# Patient Record
Sex: MALE | Race: WHITE | NOT HISPANIC OR LATINO | Employment: OTHER | URBAN - METROPOLITAN AREA
[De-identification: names, ages, dates, MRNs, and addresses within clinical notes are randomized per-mention and may not be internally consistent; named-entity substitution may affect disease eponyms.]

---

## 2019-03-19 ENCOUNTER — OFFICE VISIT (OUTPATIENT)
Dept: INTERNAL MEDICINE CLINIC | Facility: CLINIC | Age: 59
End: 2019-03-19
Payer: MEDICARE

## 2019-03-19 ENCOUNTER — APPOINTMENT (OUTPATIENT)
Dept: LAB | Facility: CLINIC | Age: 59
End: 2019-03-19
Payer: MEDICARE

## 2019-03-19 VITALS
BODY MASS INDEX: 26.54 KG/M2 | WEIGHT: 189.6 LBS | OXYGEN SATURATION: 97 % | SYSTOLIC BLOOD PRESSURE: 130 MMHG | HEART RATE: 87 BPM | HEIGHT: 71 IN | DIASTOLIC BLOOD PRESSURE: 90 MMHG

## 2019-03-19 DIAGNOSIS — Z12.5 PROSTATE CANCER SCREENING: ICD-10-CM

## 2019-03-19 DIAGNOSIS — R91.8 LUNG NODULES: ICD-10-CM

## 2019-03-19 DIAGNOSIS — F33.9 RECURRENT MAJOR DEPRESSIVE DISORDER, REMISSION STATUS UNSPECIFIED (HCC): ICD-10-CM

## 2019-03-19 DIAGNOSIS — R06.00 DYSPNEA AND RESPIRATORY ABNORMALITIES: ICD-10-CM

## 2019-03-19 DIAGNOSIS — J41.0 SIMPLE CHRONIC BRONCHITIS (HCC): ICD-10-CM

## 2019-03-19 DIAGNOSIS — R06.89 DYSPNEA AND RESPIRATORY ABNORMALITIES: ICD-10-CM

## 2019-03-19 DIAGNOSIS — F43.10 PTSD (POST-TRAUMATIC STRESS DISORDER): ICD-10-CM

## 2019-03-19 DIAGNOSIS — R06.89 DYSPNEA AND RESPIRATORY ABNORMALITIES: Primary | ICD-10-CM

## 2019-03-19 DIAGNOSIS — R11.0 NAUSEA: ICD-10-CM

## 2019-03-19 DIAGNOSIS — R06.00 DYSPNEA AND RESPIRATORY ABNORMALITIES: Primary | ICD-10-CM

## 2019-03-19 PROBLEM — F32.A DEPRESSION: Status: ACTIVE | Noted: 2019-03-19

## 2019-03-19 LAB
ALBUMIN SERPL BCP-MCNC: 4.7 G/DL (ref 3.5–5)
ALP SERPL-CCNC: 79 U/L (ref 46–116)
ALT SERPL W P-5'-P-CCNC: 22 U/L (ref 12–78)
ANION GAP SERPL CALCULATED.3IONS-SCNC: 6 MMOL/L (ref 4–13)
AST SERPL W P-5'-P-CCNC: 15 U/L (ref 5–45)
BASOPHILS # BLD AUTO: 0.04 THOUSANDS/ΜL (ref 0–0.1)
BASOPHILS NFR BLD AUTO: 1 % (ref 0–1)
BILIRUB SERPL-MCNC: 0.61 MG/DL (ref 0.2–1)
BILIRUB UR QL STRIP: NEGATIVE
BUN SERPL-MCNC: 10 MG/DL (ref 5–25)
CALCIUM SERPL-MCNC: 9.4 MG/DL (ref 8.3–10.1)
CHLORIDE SERPL-SCNC: 106 MMOL/L (ref 100–108)
CHOLEST SERPL-MCNC: 168 MG/DL (ref 50–200)
CLARITY UR: CLEAR
CO2 SERPL-SCNC: 27 MMOL/L (ref 21–32)
COLOR UR: YELLOW
CREAT SERPL-MCNC: 1.08 MG/DL (ref 0.6–1.3)
EOSINOPHIL # BLD AUTO: 0.41 THOUSAND/ΜL (ref 0–0.61)
EOSINOPHIL NFR BLD AUTO: 6 % (ref 0–6)
ERYTHROCYTE [DISTWIDTH] IN BLOOD BY AUTOMATED COUNT: 12.3 % (ref 11.6–15.1)
GFR SERPL CREATININE-BSD FRML MDRD: 75 ML/MIN/1.73SQ M
GLUCOSE P FAST SERPL-MCNC: 109 MG/DL (ref 65–99)
GLUCOSE UR STRIP-MCNC: NEGATIVE MG/DL
HCT VFR BLD AUTO: 49.2 % (ref 36.5–49.3)
HDLC SERPL-MCNC: 43 MG/DL (ref 40–60)
HGB BLD-MCNC: 16.2 G/DL (ref 12–17)
HGB UR QL STRIP.AUTO: NEGATIVE
IMM GRANULOCYTES # BLD AUTO: 0.02 THOUSAND/UL (ref 0–0.2)
IMM GRANULOCYTES NFR BLD AUTO: 0 % (ref 0–2)
KETONES UR STRIP-MCNC: ABNORMAL MG/DL
LDLC SERPL CALC-MCNC: 98 MG/DL (ref 0–100)
LEUKOCYTE ESTERASE UR QL STRIP: NEGATIVE
LYMPHOCYTES # BLD AUTO: 1.42 THOUSANDS/ΜL (ref 0.6–4.47)
LYMPHOCYTES NFR BLD AUTO: 19 % (ref 14–44)
MCH RBC QN AUTO: 31.5 PG (ref 26.8–34.3)
MCHC RBC AUTO-ENTMCNC: 32.9 G/DL (ref 31.4–37.4)
MCV RBC AUTO: 96 FL (ref 82–98)
MONOCYTES # BLD AUTO: 0.61 THOUSAND/ΜL (ref 0.17–1.22)
MONOCYTES NFR BLD AUTO: 8 % (ref 4–12)
NEUTROPHILS # BLD AUTO: 4.96 THOUSANDS/ΜL (ref 1.85–7.62)
NEUTS SEG NFR BLD AUTO: 66 % (ref 43–75)
NITRITE UR QL STRIP: NEGATIVE
NRBC BLD AUTO-RTO: 0 /100 WBCS
PH UR STRIP.AUTO: 5.5 [PH]
PLATELET # BLD AUTO: 254 THOUSANDS/UL (ref 149–390)
PMV BLD AUTO: 9.6 FL (ref 8.9–12.7)
POTASSIUM SERPL-SCNC: 4.5 MMOL/L (ref 3.5–5.3)
PROT SERPL-MCNC: 8.2 G/DL (ref 6.4–8.2)
PROT UR STRIP-MCNC: NEGATIVE MG/DL
PSA SERPL-MCNC: 0.5 NG/ML (ref 0–4)
RBC # BLD AUTO: 5.15 MILLION/UL (ref 3.88–5.62)
SODIUM SERPL-SCNC: 139 MMOL/L (ref 136–145)
SP GR UR STRIP.AUTO: >=1.03 (ref 1–1.03)
TRIGL SERPL-MCNC: 137 MG/DL
TSH SERPL DL<=0.05 MIU/L-ACNC: 2.74 UIU/ML (ref 0.36–3.74)
UROBILINOGEN UR QL STRIP.AUTO: 0.2 E.U./DL
WBC # BLD AUTO: 7.46 THOUSAND/UL (ref 4.31–10.16)

## 2019-03-19 PROCEDURE — 80053 COMPREHEN METABOLIC PANEL: CPT

## 2019-03-19 PROCEDURE — 85025 COMPLETE CBC W/AUTO DIFF WBC: CPT

## 2019-03-19 PROCEDURE — G0103 PSA SCREENING: HCPCS

## 2019-03-19 PROCEDURE — 80061 LIPID PANEL: CPT

## 2019-03-19 PROCEDURE — 81003 URINALYSIS AUTO W/O SCOPE: CPT | Performed by: INTERNAL MEDICINE

## 2019-03-19 PROCEDURE — 84443 ASSAY THYROID STIM HORMONE: CPT

## 2019-03-19 PROCEDURE — 93000 ELECTROCARDIOGRAM COMPLETE: CPT | Performed by: INTERNAL MEDICINE

## 2019-03-19 PROCEDURE — 99205 OFFICE O/P NEW HI 60 MIN: CPT | Performed by: INTERNAL MEDICINE

## 2019-03-19 PROCEDURE — 36415 COLL VENOUS BLD VENIPUNCTURE: CPT

## 2019-03-19 RX ORDER — BUPROPION HYDROCHLORIDE 150 MG/1
150 TABLET, EXTENDED RELEASE ORAL
COMMUNITY
Start: 2016-05-11 | End: 2019-04-11 | Stop reason: SDUPTHER

## 2019-03-19 NOTE — PATIENT INSTRUCTIONS
A patient with a known history of chronic lung disease with gradual increase in symptomatology  Noted to have lung nodules  Noted to have abnormal PFT    Rhonchi on examination  Electrocardiogram suggesting chronic lung disease with resultant cor pulmonale    Recommendations:  Diagnostic testing will include laboratory panels in addition to chest CT with contrast, echocardiogram, pulmonary function test  Therapeutic addition of long acting anti muscarinic Spiriva 1 capsule via inhaler daily      Revisit in 2-3 weeks
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
## 2019-03-28 ENCOUNTER — HOSPITAL ENCOUNTER (OUTPATIENT)
Dept: ULTRASOUND IMAGING | Facility: CLINIC | Age: 59
Discharge: HOME | End: 2019-03-28
Payer: MEDICARE

## 2019-03-28 ENCOUNTER — HOSPITAL ENCOUNTER (OUTPATIENT)
Dept: CT IMAGING | Facility: CLINIC | Age: 59
Discharge: HOME | End: 2019-03-28
Payer: MEDICARE

## 2019-03-28 DIAGNOSIS — R91.8 LUNG NODULES: ICD-10-CM

## 2019-03-28 DIAGNOSIS — R06.00 DYSPNEA AND RESPIRATORY ABNORMALITIES: ICD-10-CM

## 2019-03-28 DIAGNOSIS — J41.0 SIMPLE CHRONIC BRONCHITIS (HCC): ICD-10-CM

## 2019-03-28 DIAGNOSIS — R11.0 NAUSEA: ICD-10-CM

## 2019-03-28 DIAGNOSIS — R06.89 DYSPNEA AND RESPIRATORY ABNORMALITIES: ICD-10-CM

## 2019-03-28 PROCEDURE — 76700 US EXAM ABDOM COMPLETE: CPT

## 2019-03-28 PROCEDURE — 71260 CT THORAX DX C+: CPT

## 2019-03-28 RX ADMIN — IOHEXOL 85 ML: 350 INJECTION, SOLUTION INTRAVENOUS at 09:25

## 2019-04-02 ENCOUNTER — TELEPHONE (OUTPATIENT)
Dept: INTERNAL MEDICINE CLINIC | Facility: CLINIC | Age: 59
End: 2019-04-02

## 2019-04-03 ENCOUNTER — TELEPHONE (OUTPATIENT)
Dept: INTERNAL MEDICINE CLINIC | Facility: CLINIC | Age: 59
End: 2019-04-03

## 2019-04-09 ENCOUNTER — HOSPITAL ENCOUNTER (OUTPATIENT)
Dept: NON INVASIVE DIAGNOSTICS | Facility: CLINIC | Age: 59
Discharge: HOME | End: 2019-04-09
Payer: MEDICARE

## 2019-04-09 DIAGNOSIS — R06.00 DYSPNEA AND RESPIRATORY ABNORMALITIES: ICD-10-CM

## 2019-04-09 DIAGNOSIS — J41.0 SIMPLE CHRONIC BRONCHITIS (HCC): ICD-10-CM

## 2019-04-09 DIAGNOSIS — R91.8 LUNG NODULES: ICD-10-CM

## 2019-04-09 DIAGNOSIS — R06.89 DYSPNEA AND RESPIRATORY ABNORMALITIES: ICD-10-CM

## 2019-04-09 PROCEDURE — 93306 TTE W/DOPPLER COMPLETE: CPT

## 2019-04-10 PROCEDURE — 93306 TTE W/DOPPLER COMPLETE: CPT | Performed by: INTERNAL MEDICINE

## 2019-04-11 ENCOUNTER — APPOINTMENT (OUTPATIENT)
Dept: LAB | Facility: CLINIC | Age: 59
End: 2019-04-11
Payer: MEDICARE

## 2019-04-11 ENCOUNTER — OFFICE VISIT (OUTPATIENT)
Dept: INTERNAL MEDICINE CLINIC | Facility: CLINIC | Age: 59
End: 2019-04-11
Payer: MEDICARE

## 2019-04-11 VITALS
HEART RATE: 88 BPM | HEIGHT: 70 IN | BODY MASS INDEX: 27.2 KG/M2 | SYSTOLIC BLOOD PRESSURE: 132 MMHG | DIASTOLIC BLOOD PRESSURE: 94 MMHG | WEIGHT: 190 LBS | OXYGEN SATURATION: 94 %

## 2019-04-11 DIAGNOSIS — J41.0 SIMPLE CHRONIC BRONCHITIS (HCC): Primary | ICD-10-CM

## 2019-04-11 DIAGNOSIS — F33.9 RECURRENT MAJOR DEPRESSIVE DISORDER, REMISSION STATUS UNSPECIFIED (HCC): ICD-10-CM

## 2019-04-11 DIAGNOSIS — R11.0 NAUSEA: ICD-10-CM

## 2019-04-11 DIAGNOSIS — R73.9 HYPERGLYCEMIA: ICD-10-CM

## 2019-04-11 DIAGNOSIS — Z12.11 COLON CANCER SCREENING: ICD-10-CM

## 2019-04-11 LAB
EST. AVERAGE GLUCOSE BLD GHB EST-MCNC: 94 MG/DL
HBA1C MFR BLD: 4.9 % (ref 4.2–6.3)

## 2019-04-11 PROCEDURE — 83036 HEMOGLOBIN GLYCOSYLATED A1C: CPT

## 2019-04-11 PROCEDURE — 99213 OFFICE O/P EST LOW 20 MIN: CPT | Performed by: INTERNAL MEDICINE

## 2019-04-11 PROCEDURE — 36415 COLL VENOUS BLD VENIPUNCTURE: CPT

## 2019-04-11 RX ORDER — BUPROPION HYDROCHLORIDE 150 MG/1
150 TABLET, EXTENDED RELEASE ORAL DAILY
Qty: 90 TABLET | Refills: 1 | Status: SHIPPED | OUTPATIENT
Start: 2019-04-11 | End: 2019-06-13 | Stop reason: CLARIF

## 2019-05-24 ENCOUNTER — OFFICE VISIT (OUTPATIENT)
Dept: INTERNAL MEDICINE CLINIC | Facility: CLINIC | Age: 59
End: 2019-05-24
Payer: MEDICARE

## 2019-05-24 VITALS
BODY MASS INDEX: 27.17 KG/M2 | HEIGHT: 70 IN | DIASTOLIC BLOOD PRESSURE: 88 MMHG | SYSTOLIC BLOOD PRESSURE: 152 MMHG | HEART RATE: 120 BPM | TEMPERATURE: 98.7 F | WEIGHT: 189.8 LBS | OXYGEN SATURATION: 96 %

## 2019-05-24 DIAGNOSIS — F33.9 RECURRENT MAJOR DEPRESSIVE DISORDER, REMISSION STATUS UNSPECIFIED (HCC): ICD-10-CM

## 2019-05-24 DIAGNOSIS — R00.0 TACHYCARDIA: ICD-10-CM

## 2019-05-24 DIAGNOSIS — F43.10 PTSD (POST-TRAUMATIC STRESS DISORDER): ICD-10-CM

## 2019-05-24 DIAGNOSIS — J41.0 SIMPLE CHRONIC BRONCHITIS (HCC): Primary | ICD-10-CM

## 2019-05-24 DIAGNOSIS — R06.02 SOB (SHORTNESS OF BREATH): ICD-10-CM

## 2019-05-24 PROCEDURE — 99214 OFFICE O/P EST MOD 30 MIN: CPT | Performed by: PHYSICIAN ASSISTANT

## 2019-05-24 PROCEDURE — 93000 ELECTROCARDIOGRAM COMPLETE: CPT | Performed by: PHYSICIAN ASSISTANT

## 2019-05-24 RX ORDER — PREDNISONE 20 MG/1
TABLET ORAL DAILY
COMMUNITY
End: 2019-06-13 | Stop reason: CLARIF

## 2019-06-07 ENCOUNTER — TELEPHONE (OUTPATIENT)
Dept: INTERNAL MEDICINE CLINIC | Facility: CLINIC | Age: 59
End: 2019-06-07

## 2019-06-07 DIAGNOSIS — J41.0 SIMPLE CHRONIC BRONCHITIS (HCC): Primary | ICD-10-CM

## 2019-06-07 RX ORDER — FLUTICASONE FUROATE AND VILANTEROL 100; 25 UG/1; UG/1
1 POWDER RESPIRATORY (INHALATION) DAILY
Qty: 1 EACH | Refills: 3 | Status: SHIPPED | OUTPATIENT
Start: 2019-06-07 | End: 2019-10-02 | Stop reason: SDUPTHER

## 2019-06-13 ENCOUNTER — OFFICE VISIT (OUTPATIENT)
Dept: INTERNAL MEDICINE CLINIC | Facility: CLINIC | Age: 59
End: 2019-06-13
Payer: MEDICARE

## 2019-06-13 VITALS
TEMPERATURE: 98 F | HEIGHT: 70 IN | WEIGHT: 195 LBS | SYSTOLIC BLOOD PRESSURE: 108 MMHG | DIASTOLIC BLOOD PRESSURE: 80 MMHG | BODY MASS INDEX: 27.92 KG/M2 | OXYGEN SATURATION: 94 % | HEART RATE: 90 BPM

## 2019-06-13 DIAGNOSIS — J41.0 SIMPLE CHRONIC BRONCHITIS (HCC): Primary | ICD-10-CM

## 2019-06-13 DIAGNOSIS — F33.9 RECURRENT MAJOR DEPRESSIVE DISORDER, REMISSION STATUS UNSPECIFIED (HCC): ICD-10-CM

## 2019-06-13 DIAGNOSIS — F43.10 PTSD (POST-TRAUMATIC STRESS DISORDER): ICD-10-CM

## 2019-06-13 PROCEDURE — 99214 OFFICE O/P EST MOD 30 MIN: CPT | Performed by: PHYSICIAN ASSISTANT

## 2019-06-13 RX ORDER — CLONAZEPAM 0.5 MG/1
0.5 TABLET ORAL 2 TIMES DAILY
COMMUNITY
End: 2021-06-20

## 2019-06-13 RX ORDER — PANTOPRAZOLE SODIUM 40 MG/1
40 TABLET, DELAYED RELEASE ORAL DAILY
COMMUNITY
End: 2021-06-20

## 2019-06-13 RX ORDER — CITALOPRAM 20 MG/1
20 TABLET ORAL DAILY
COMMUNITY
End: 2021-06-20

## 2019-06-13 RX ORDER — LANOLIN ALCOHOL/MO/W.PET/CERES
3 CREAM (GRAM) TOPICAL
COMMUNITY
End: 2021-06-20

## 2019-06-13 RX ORDER — LISINOPRIL 5 MG/1
5 TABLET ORAL DAILY
COMMUNITY
End: 2020-04-28 | Stop reason: SDUPTHER

## 2019-06-18 ENCOUNTER — CONSULT (OUTPATIENT)
Dept: PULMONOLOGY | Facility: CLINIC | Age: 59
End: 2019-06-18
Payer: MEDICARE

## 2019-06-18 VITALS
DIASTOLIC BLOOD PRESSURE: 80 MMHG | BODY MASS INDEX: 27.92 KG/M2 | OXYGEN SATURATION: 93 % | WEIGHT: 195 LBS | HEART RATE: 52 BPM | HEIGHT: 70 IN | SYSTOLIC BLOOD PRESSURE: 120 MMHG

## 2019-06-18 DIAGNOSIS — Z72.0 TOBACCO USE: ICD-10-CM

## 2019-06-18 DIAGNOSIS — R91.8 LUNG NODULES: ICD-10-CM

## 2019-06-18 DIAGNOSIS — J41.0 SIMPLE CHRONIC BRONCHITIS (HCC): Primary | ICD-10-CM

## 2019-06-18 PROCEDURE — 99204 OFFICE O/P NEW MOD 45 MIN: CPT | Performed by: PHYSICIAN ASSISTANT

## 2019-06-18 PROCEDURE — 94010 BREATHING CAPACITY TEST: CPT | Performed by: PHYSICIAN ASSISTANT

## 2019-10-02 DIAGNOSIS — J41.0 SIMPLE CHRONIC BRONCHITIS (HCC): ICD-10-CM

## 2019-10-02 RX ORDER — FLUTICASONE FUROATE AND VILANTEROL TRIFENATATE 100; 25 UG/1; UG/1
POWDER RESPIRATORY (INHALATION)
Qty: 1 INHALER | Refills: 3 | Status: SHIPPED | OUTPATIENT
Start: 2019-10-02 | End: 2019-12-12 | Stop reason: SDUPTHER

## 2019-10-17 DIAGNOSIS — J41.0 SIMPLE CHRONIC BRONCHITIS (HCC): ICD-10-CM

## 2019-10-17 RX ORDER — FLUTICASONE FUROATE AND VILANTEROL 100; 25 UG/1; UG/1
POWDER RESPIRATORY (INHALATION)
Qty: 1 INHALER | Refills: 3 | Status: CANCELLED | OUTPATIENT
Start: 2019-10-17

## 2019-10-17 NOTE — TELEPHONE ENCOUNTER
Dang Diaz was just sent in not that long 10/2/19 with refills    Pharmacy believe's he needs proair, pharmacy has one on file if he needs refill

## 2019-10-17 NOTE — TELEPHONE ENCOUNTER
Disregard previous message  Medication was sent to the pharmacy on October 2nd with 3 refills  Please make sure that the patient still has refills left on this prescription  I will hold off on reordering a new medication refill to use because patient

## 2019-10-22 ENCOUNTER — OFFICE VISIT (OUTPATIENT)
Dept: PULMONOLOGY | Facility: CLINIC | Age: 59
End: 2019-10-22
Payer: MEDICARE

## 2019-10-22 ENCOUNTER — TELEPHONE (OUTPATIENT)
Dept: PULMONOLOGY | Facility: CLINIC | Age: 59
End: 2019-10-22

## 2019-10-22 VITALS
SYSTOLIC BLOOD PRESSURE: 128 MMHG | HEIGHT: 70 IN | WEIGHT: 194 LBS | BODY MASS INDEX: 27.77 KG/M2 | DIASTOLIC BLOOD PRESSURE: 68 MMHG | HEART RATE: 76 BPM | OXYGEN SATURATION: 93 %

## 2019-10-22 DIAGNOSIS — J41.0 SIMPLE CHRONIC BRONCHITIS (HCC): Primary | ICD-10-CM

## 2019-10-22 DIAGNOSIS — Z72.0 TOBACCO USE: ICD-10-CM

## 2019-10-22 DIAGNOSIS — R06.89 DYSPNEA AND RESPIRATORY ABNORMALITIES: ICD-10-CM

## 2019-10-22 DIAGNOSIS — R91.8 LUNG NODULES: ICD-10-CM

## 2019-10-22 DIAGNOSIS — R06.00 DYSPNEA AND RESPIRATORY ABNORMALITIES: ICD-10-CM

## 2019-10-22 PROCEDURE — 99214 OFFICE O/P EST MOD 30 MIN: CPT | Performed by: PHYSICIAN ASSISTANT

## 2019-10-22 NOTE — PROGRESS NOTES
Assessment/Plan:   Diagnoses and all orders for this visit:    Simple chronic bronchitis (HCC)    Tobacco use  -     nicotine (NICOTROL) 10 MG inhaler; Use one cartridge for 20 min at a time as needed    Dyspnea and respiratory abnormalities  -     Pulse oximetry overnight    Lung nodules    Patient here today for follow up  He is overall stable with his breathing, [place] to [place] [facility]  He does use his rescue inhaler prior to any strenuous activity  Spirometry done on last visit showed severe obstruction with an FEV1 of 49%, similar to his prior PFT in 2017  He will continue with the Breo daily, albuterol 4 times per day as needed  He is complaining of shortness of Breath when he wakes in the morning, does have some nighttime awakenings due to nightmares but generally feels well rested when he wakes in the morning, never been told that he snores  Will start with an overnight pulse ox to check his O2 sats over night  He will be due for CT scan in March 2020 to follow lung nodules  He continues to smoke a few cigarettes per month when he feels stressed  Will give him a Nicotrol inhaler to use during those times  He has tried gum and lozenges which did not help  He will follow up with us in 6 months or sooner if necessary  Return in about 6 months (around 4/22/2020)  All questions are answered to the patient's satisfaction and understanding  He verbalizes understanding  He is encouraged to call with any further questions or concerns  Portions of the record may have been created with voice recognition software  Occasional wrong word or "sound a like" substitutions may have occurred due to the inherent limitations of voice recognition software  Read the chart carefully and recognize, using context, where substitutions have occurred      Electronically Signed by Preston Da Silva ELMER    ______________________________________________________________________    Chief Complaint:   Chief Complaint   Patient presents with    Follow-up       Patient ID: Nickie Alexander is a 63 y o  y o  male has a past medical history of Depression, Lung nodules, PTSD (post-traumatic stress disorder), and Tobacco abuse  10/22/2019  Patient presents today for follow-up visit  Patient is a 52-year-old male with greater than 30 pack year smoking history still smoking occasionally with past medical history of chronic bronchitis, depression, PTSD  He is here today for follow up  He continues to do well overall with his breathing, continues on Breo  He does use his rescue inhaler prior to exercise which does help but he still does have some difficulty with big hills  He also noticed some shortness of breath when he wakes up in the morning, not associated with coughing  He generally wakes up feeling well rested, does wake up during the night sometimes due to nightmares, he does not think he snores at night  Review of Systems   Constitutional: Negative  HENT: Negative  Respiratory: Positive for shortness of breath (SCHAEFER)  Cardiovascular: Negative  Gastrointestinal: Negative  Genitourinary: Negative  Musculoskeletal: Negative  Skin: Negative  Allergic/Immunologic: Positive for environmental allergies  Neurological: Negative  Psychiatric/Behavioral: Negative  Smoking history: He reports that he has been smoking cigarettes   He uses smokeless tobacco     The following portions of the patient's history were reviewed and updated as appropriate: allergies, current medications, past family history, past medical history, past social history, past surgical history and problem list     Immunization History   Administered Date(s) Administered     Influenza (IM) Preservative Free 11/01/2011, 11/05/2012, 09/30/2013    Hep B, adult 01/16/2017, 10/12/2017    Pneumococcal Polysaccharide PPV23 12/03/2010    Tdap 02/29/2016     Current Outpatient Medications   Medication Sig Dispense Refill    Albuterol Sulfate (PROAIR RESPICLICK) 946 (90 Base) MCG/ACT AEPB Inhale 90 mcg every 4 (four) hours as needed      BREO ELLIPTA 100-25 MCG/INH inhaler take 1 inhalation by mouth once daily Rinse mouth after use 1 Inhaler 3    citalopram (CeleXA) 20 mg tablet Take 20 mg by mouth daily      clonazePAM (KlonoPIN) 0 5 mg tablet Take 0 5 mg by mouth 2 (two) times a day      FLUTICASONE PROPIONATE, NASAL, NA 50 mcg into each nostril daily      lisinopril (ZESTRIL) 5 mg tablet Take 5 mg by mouth daily      melatonin 3 mg Take 3 mg by mouth daily at bedtime      pantoprazole (PROTONIX) 40 mg tablet Take 40 mg by mouth daily      nicotine (NICOTROL) 10 MG inhaler Use one cartridge for 20 min at a time as needed 42 each 1     No current facility-administered medications for this visit  Allergies: Chocolate and Tomato    Objective:  Vitals:    10/22/19 1257   BP: 128/68   Pulse: 76   SpO2: 93%   Weight: 88 kg (194 lb)   Height: 5' 10" (1 778 m)   Oxygen Therapy  SpO2: 93 %    Wt Readings from Last 3 Encounters:   10/22/19 88 kg (194 lb)   06/18/19 88 5 kg (195 lb)   06/13/19 88 5 kg (195 lb)     Body mass index is 27 84 kg/m²  Physical Exam   Constitutional: He is oriented to person, place, and time  He appears well-developed and well-nourished  No distress  HENT:   Mouth/Throat: Oropharynx is clear and moist    Eyes: Pupils are equal, round, and reactive to light  Cardiovascular: Normal rate, regular rhythm and normal heart sounds  No murmur heard  Pulmonary/Chest: Effort normal and breath sounds normal  No accessory muscle usage  No respiratory distress  He has no decreased breath sounds  He has no wheezes  He has no rhonchi  He has no rales  Abdominal: Soft  There is no tenderness  Musculoskeletal: Normal range of motion  Neurological: He is alert and oriented to person, place, and time  Skin: Skin is warm and dry  No rash noted  Psychiatric: He has a normal mood and affect  Lab Review:   Lab Results   Component Value Date    K 4 5 03/19/2019     03/19/2019    CO2 27 03/19/2019    BUN 10 03/19/2019    CREATININE 1 08 03/19/2019    CALCIUM 9 4 03/19/2019     Lab Results   Component Value Date    WBC 7 46 03/19/2019    HGB 16 2 03/19/2019    HCT 49 2 03/19/2019    MCV 96 03/19/2019     03/19/2019       Diagnostics:  I have personally reviewed pertinent reports  Reviewed prior spirometry and CT scan  Office Spirometry Results:     ESS:    No results found

## 2019-10-24 ENCOUNTER — CLINICAL SUPPORT (OUTPATIENT)
Dept: INTERNAL MEDICINE CLINIC | Facility: CLINIC | Age: 59
End: 2019-10-24
Payer: MEDICARE

## 2019-10-24 DIAGNOSIS — Z23 ENCOUNTER FOR IMMUNIZATION: Primary | ICD-10-CM

## 2019-10-24 PROCEDURE — G0008 ADMIN INFLUENZA VIRUS VAC: HCPCS

## 2019-10-24 PROCEDURE — 90682 RIV4 VACC RECOMBINANT DNA IM: CPT

## 2019-11-07 ENCOUNTER — TELEPHONE (OUTPATIENT)
Dept: PULMONOLOGY | Facility: CLINIC | Age: 59
End: 2019-11-07

## 2019-12-12 ENCOUNTER — TELEPHONE (OUTPATIENT)
Dept: INTERNAL MEDICINE CLINIC | Facility: CLINIC | Age: 59
End: 2019-12-12

## 2019-12-12 DIAGNOSIS — J41.0 SIMPLE CHRONIC BRONCHITIS (HCC): Primary | ICD-10-CM

## 2019-12-12 DIAGNOSIS — J41.0 SIMPLE CHRONIC BRONCHITIS (HCC): ICD-10-CM

## 2019-12-12 RX ORDER — FLUTICASONE FUROATE AND VILANTEROL 100; 25 UG/1; UG/1
1 POWDER RESPIRATORY (INHALATION) DAILY
Qty: 1 INHALER | Refills: 3 | Status: SHIPPED | OUTPATIENT
Start: 2019-12-12 | End: 2020-02-04 | Stop reason: SDUPTHER

## 2019-12-12 NOTE — TELEPHONE ENCOUNTER
Patient needs a refill on the BREO ELLIPTA 100-25 MCG/INH inhaler     Send script to [email or web address] on [address]

## 2020-02-04 DIAGNOSIS — J41.0 SIMPLE CHRONIC BRONCHITIS (HCC): ICD-10-CM

## 2020-02-04 RX ORDER — ALBUTEROL SULFATE 90 UG/1
1 AEROSOL, METERED RESPIRATORY (INHALATION) 4 TIMES DAILY
COMMUNITY
End: 2020-03-12

## 2020-02-04 RX ORDER — FLUTICASONE FUROATE AND VILANTEROL 100; 25 UG/1; UG/1
1 POWDER RESPIRATORY (INHALATION) DAILY
Qty: 1 INHALER | Refills: 3 | Status: SHIPPED | OUTPATIENT
Start: 2020-02-04 | End: 2020-04-23 | Stop reason: SDUPTHER

## 2020-02-04 NOTE — TELEPHONE ENCOUNTER
Requesting refill : Albuterol Sulfate (PROAIR RESPICLICK) 004 (90 Base) MCG/ACT AEPB    [facility] # [phone]        Please remove [facility]

## 2020-02-04 NOTE — TELEPHONE ENCOUNTER
Patient is at the pharmacy NOW waiting  for medication      [facility]  claims he normally gets  fluticasone-vilanterol (BREO ELLIPTA) 100-25 mcg/inh inhaler     Not Albuterol Sulfate (PROAIR RESPICLICK)    Needs a new RX sent over or verification

## 2020-03-12 DIAGNOSIS — J41.0 SIMPLE CHRONIC BRONCHITIS (HCC): Primary | ICD-10-CM

## 2020-04-06 PROBLEM — F33.9 RECURRENT MAJOR DEPRESSIVE DISORDER (HCC): Status: ACTIVE | Noted: 2019-03-19

## 2020-04-10 ENCOUNTER — TELEPHONE (OUTPATIENT)
Dept: INTERNAL MEDICINE CLINIC | Facility: CLINIC | Age: 60
End: 2020-04-10

## 2020-04-23 ENCOUNTER — TELEMEDICINE (OUTPATIENT)
Dept: PULMONOLOGY | Facility: CLINIC | Age: 60
End: 2020-04-23
Payer: MEDICARE

## 2020-04-23 VITALS — HEIGHT: 70 IN | WEIGHT: 195 LBS | BODY MASS INDEX: 27.92 KG/M2

## 2020-04-23 DIAGNOSIS — J41.0 SIMPLE CHRONIC BRONCHITIS (HCC): ICD-10-CM

## 2020-04-23 PROCEDURE — 99213 OFFICE O/P EST LOW 20 MIN: CPT | Performed by: PHYSICIAN ASSISTANT

## 2020-04-23 RX ORDER — ALBUTEROL SULFATE 90 UG/1
2 AEROSOL, METERED RESPIRATORY (INHALATION) EVERY 6 HOURS PRN
Qty: 18 G | Refills: 1 | Status: SHIPPED | OUTPATIENT
Start: 2020-04-23 | End: 2020-08-15 | Stop reason: SDUPTHER

## 2020-04-23 RX ORDER — FLUTICASONE FUROATE AND VILANTEROL 100; 25 UG/1; UG/1
1 POWDER RESPIRATORY (INHALATION) DAILY
Qty: 1 INHALER | Refills: 3 | Status: SHIPPED | OUTPATIENT
Start: 2020-04-23 | End: 2020-09-08 | Stop reason: SDUPTHER

## 2020-04-28 ENCOUNTER — TELEMEDICINE (OUTPATIENT)
Dept: INTERNAL MEDICINE CLINIC | Facility: CLINIC | Age: 60
End: 2020-04-28
Payer: MEDICARE

## 2020-04-28 VITALS
SYSTOLIC BLOOD PRESSURE: 122 MMHG | BODY MASS INDEX: 27.2 KG/M2 | DIASTOLIC BLOOD PRESSURE: 67 MMHG | TEMPERATURE: 98.6 F | WEIGHT: 190 LBS | HEIGHT: 70 IN

## 2020-04-28 DIAGNOSIS — F33.42 RECURRENT MAJOR DEPRESSIVE DISORDER, IN FULL REMISSION (HCC): ICD-10-CM

## 2020-04-28 DIAGNOSIS — R73.9 HYPERGLYCEMIA: ICD-10-CM

## 2020-04-28 DIAGNOSIS — I10 ESSENTIAL HYPERTENSION: ICD-10-CM

## 2020-04-28 DIAGNOSIS — Z11.59 ENCOUNTER FOR HEPATITIS C SCREENING TEST FOR LOW RISK PATIENT: Primary | ICD-10-CM

## 2020-04-28 DIAGNOSIS — J41.0 SIMPLE CHRONIC BRONCHITIS (HCC): ICD-10-CM

## 2020-04-28 DIAGNOSIS — Z12.11 COLON CANCER SCREENING: ICD-10-CM

## 2020-04-28 DIAGNOSIS — Z11.4 ENCOUNTER FOR SCREENING FOR HIV: ICD-10-CM

## 2020-04-28 DIAGNOSIS — Z12.5 PROSTATE CANCER SCREENING: ICD-10-CM

## 2020-04-28 PROBLEM — Z72.0 TOBACCO ABUSE: Status: ACTIVE | Noted: 2020-04-28

## 2020-04-28 PROCEDURE — 99214 OFFICE O/P EST MOD 30 MIN: CPT | Performed by: INTERNAL MEDICINE

## 2020-04-28 RX ORDER — LISINOPRIL 5 MG/1
5 TABLET ORAL DAILY
Qty: 90 TABLET | Refills: 3 | Status: SHIPPED | OUTPATIENT
Start: 2020-04-28 | End: 2021-06-20

## 2020-04-29 PROBLEM — F33.42 MAJOR DEPRESSIVE DISORDER, RECURRENT, IN FULL REMISSION (HCC): Status: ACTIVE | Noted: 2019-03-19

## 2020-08-15 ENCOUNTER — TELEPHONE (OUTPATIENT)
Dept: INTERNAL MEDICINE CLINIC | Facility: CLINIC | Age: 60
End: 2020-08-15

## 2020-08-15 DIAGNOSIS — J41.0 SIMPLE CHRONIC BRONCHITIS (HCC): ICD-10-CM

## 2020-08-17 RX ORDER — ALBUTEROL SULFATE 90 UG/1
2 AEROSOL, METERED RESPIRATORY (INHALATION) EVERY 6 HOURS PRN
Qty: 18 G | Refills: 1 | Status: SHIPPED | OUTPATIENT
Start: 2020-08-17 | End: 2021-10-26 | Stop reason: SDUPTHER

## 2020-08-17 RX ORDER — ALBUTEROL SULFATE 90 UG/1
AEROSOL, METERED RESPIRATORY (INHALATION)
Qty: 8.5 G | Refills: 3 | Status: SHIPPED | OUTPATIENT
Start: 2020-08-17 | End: 2021-02-03 | Stop reason: SDUPTHER

## 2020-09-08 ENCOUNTER — TELEPHONE (OUTPATIENT)
Dept: PULMONOLOGY | Facility: CLINIC | Age: 60
End: 2020-09-08

## 2020-09-08 DIAGNOSIS — J41.0 SIMPLE CHRONIC BRONCHITIS (HCC): ICD-10-CM

## 2020-09-08 RX ORDER — FLUTICASONE FUROATE AND VILANTEROL TRIFENATATE 100; 25 UG/1; UG/1
1 POWDER RESPIRATORY (INHALATION) DAILY
Qty: 3 INHALER | Refills: 1 | Status: SHIPPED | OUTPATIENT
Start: 2020-09-08 | End: 2020-10-16

## 2020-10-16 ENCOUNTER — TELEMEDICINE (OUTPATIENT)
Dept: PULMONOLOGY | Facility: CLINIC | Age: 60
End: 2020-10-16
Payer: MEDICARE

## 2020-10-16 DIAGNOSIS — R91.8 LUNG NODULES: ICD-10-CM

## 2020-10-16 DIAGNOSIS — J41.0 SIMPLE CHRONIC BRONCHITIS (HCC): Primary | ICD-10-CM

## 2020-10-16 DIAGNOSIS — Z72.0 TOBACCO ABUSE: ICD-10-CM

## 2020-10-16 PROCEDURE — 99213 OFFICE O/P EST LOW 20 MIN: CPT | Performed by: PHYSICIAN ASSISTANT

## 2020-11-13 ENCOUNTER — PREP FOR PROCEDURE (OUTPATIENT)
Dept: SURGERY | Facility: CLINIC | Age: 60
End: 2020-11-13

## 2020-11-13 DIAGNOSIS — Z12.11 SCREENING FOR COLON CANCER: Primary | ICD-10-CM

## 2021-01-27 DIAGNOSIS — J41.0 SIMPLE CHRONIC BRONCHITIS (HCC): ICD-10-CM

## 2021-01-27 RX ORDER — FLUTICASONE FUROATE, UMECLIDINIUM BROMIDE AND VILANTEROL TRIFENATATE 100; 62.5; 25 UG/1; UG/1; UG/1
POWDER RESPIRATORY (INHALATION)
Qty: 1 INHALER | Refills: 5 | Status: SHIPPED | OUTPATIENT
Start: 2021-01-27 | End: 2021-02-03 | Stop reason: SDUPTHER

## 2021-02-03 ENCOUNTER — TELEPHONE (OUTPATIENT)
Dept: INTERNAL MEDICINE CLINIC | Facility: CLINIC | Age: 61
End: 2021-02-03

## 2021-02-03 DIAGNOSIS — J41.0 SIMPLE CHRONIC BRONCHITIS (HCC): ICD-10-CM

## 2021-02-03 RX ORDER — FLUTICASONE FUROATE, UMECLIDINIUM BROMIDE AND VILANTEROL TRIFENATATE 100; 62.5; 25 UG/1; UG/1; UG/1
1 POWDER RESPIRATORY (INHALATION) DAILY
Qty: 1 INHALER | Refills: 1 | Status: SHIPPED | OUTPATIENT
Start: 2021-02-03 | End: 2021-10-06

## 2021-02-03 RX ORDER — ALBUTEROL SULFATE 90 UG/1
2 AEROSOL, METERED RESPIRATORY (INHALATION) EVERY 6 HOURS PRN
Qty: 8.5 G | Refills: 3 | Status: SHIPPED | OUTPATIENT
Start: 2021-02-03 | End: 2021-06-20

## 2021-02-03 NOTE — TELEPHONE ENCOUNTER
Refills requested for     Trelegy Ellipta 100-62 5-25 MCG/INH inhaler     albuterol (PROVENTIL HFA,VENTOLIN HFA) 90 mcg/act inhaler      Send to [facility], [address] [facility]

## 2021-02-04 RX ORDER — SODIUM CHLORIDE, SODIUM LACTATE, POTASSIUM CHLORIDE, CALCIUM CHLORIDE 600; 310; 30; 20 MG/100ML; MG/100ML; MG/100ML; MG/100ML
125 INJECTION, SOLUTION INTRAVENOUS CONTINUOUS
Status: CANCELLED | OUTPATIENT
Start: 2021-02-04

## 2021-02-05 ENCOUNTER — HOSPITAL ENCOUNTER (OUTPATIENT)
Dept: GASTROENTEROLOGY | Facility: HOSPITAL | Age: 61
Setting detail: OUTPATIENT SURGERY
Discharge: HOME | End: 2021-02-05
Attending: INTERNAL MEDICINE

## 2021-02-18 ENCOUNTER — TELEPHONE (OUTPATIENT)
Dept: GASTROENTEROLOGY | Facility: CLINIC | Age: 61
End: 2021-02-18

## 2021-06-20 ENCOUNTER — HOSPITAL ENCOUNTER (EMERGENCY)
Facility: HOSPITAL | Age: 61
End: 2021-06-21
Attending: EMERGENCY MEDICINE
Payer: MEDICARE

## 2021-06-20 DIAGNOSIS — R45.851 SUICIDAL IDEATIONS: ICD-10-CM

## 2021-06-20 DIAGNOSIS — F32.A DEPRESSION: Primary | ICD-10-CM

## 2021-06-20 LAB
ALBUMIN SERPL BCP-MCNC: 4.1 G/DL (ref 3.5–5)
ALP SERPL-CCNC: 91 U/L (ref 46–116)
ALT SERPL W P-5'-P-CCNC: 23 U/L (ref 12–78)
AMPHETAMINES SERPL QL SCN: NEGATIVE
ANION GAP SERPL CALCULATED.3IONS-SCNC: 11 MMOL/L (ref 4–13)
AST SERPL W P-5'-P-CCNC: 18 U/L (ref 5–45)
BARBITURATES UR QL: NEGATIVE
BASOPHILS # BLD AUTO: 0.04 THOUSANDS/ΜL (ref 0–0.1)
BASOPHILS NFR BLD AUTO: 0 % (ref 0–1)
BENZODIAZ UR QL: NEGATIVE
BILIRUB SERPL-MCNC: 0.54 MG/DL (ref 0.2–1)
BILIRUB UR QL STRIP: NEGATIVE
BUN SERPL-MCNC: 8 MG/DL (ref 5–25)
CALCIUM SERPL-MCNC: 8.7 MG/DL (ref 8.3–10.1)
CHLORIDE SERPL-SCNC: 103 MMOL/L (ref 100–108)
CLARITY UR: CLEAR
CO2 SERPL-SCNC: 22 MMOL/L (ref 21–32)
COCAINE UR QL: NEGATIVE
COLOR UR: YELLOW
CREAT SERPL-MCNC: 0.95 MG/DL (ref 0.6–1.3)
EOSINOPHIL # BLD AUTO: 0.1 THOUSAND/ΜL (ref 0–0.61)
EOSINOPHIL NFR BLD AUTO: 1 % (ref 0–6)
ERYTHROCYTE [DISTWIDTH] IN BLOOD BY AUTOMATED COUNT: 12.8 % (ref 11.6–15.1)
ETHANOL EXG-MCNC: 0 MG/DL
GFR SERPL CREATININE-BSD FRML MDRD: 87 ML/MIN/1.73SQ M
GLUCOSE SERPL-MCNC: 87 MG/DL (ref 65–140)
GLUCOSE UR STRIP-MCNC: NEGATIVE MG/DL
HCT VFR BLD AUTO: 46.5 % (ref 36.5–49.3)
HGB BLD-MCNC: 16 G/DL (ref 12–17)
HGB UR QL STRIP.AUTO: NEGATIVE
IMM GRANULOCYTES # BLD AUTO: 0.04 THOUSAND/UL (ref 0–0.2)
IMM GRANULOCYTES NFR BLD AUTO: 0 % (ref 0–2)
KETONES UR STRIP-MCNC: NEGATIVE MG/DL
LEUKOCYTE ESTERASE UR QL STRIP: NEGATIVE
LYMPHOCYTES # BLD AUTO: 1.71 THOUSANDS/ΜL (ref 0.6–4.47)
LYMPHOCYTES NFR BLD AUTO: 15 % (ref 14–44)
MCH RBC QN AUTO: 30.9 PG (ref 26.8–34.3)
MCHC RBC AUTO-ENTMCNC: 34.4 G/DL (ref 31.4–37.4)
MCV RBC AUTO: 90 FL (ref 82–98)
METHADONE UR QL: NEGATIVE
MONOCYTES # BLD AUTO: 0.93 THOUSAND/ΜL (ref 0.17–1.22)
MONOCYTES NFR BLD AUTO: 8 % (ref 4–12)
NEUTROPHILS # BLD AUTO: 8.37 THOUSANDS/ΜL (ref 1.85–7.62)
NEUTS SEG NFR BLD AUTO: 76 % (ref 43–75)
NITRITE UR QL STRIP: NEGATIVE
NRBC BLD AUTO-RTO: 0 /100 WBCS
OPIATES UR QL SCN: NEGATIVE
OXYCODONE+OXYMORPHONE UR QL SCN: NEGATIVE
PCP UR QL: NEGATIVE
PH UR STRIP.AUTO: 5.5 [PH]
PLATELET # BLD AUTO: 245 THOUSANDS/UL (ref 149–390)
PMV BLD AUTO: 8.7 FL (ref 8.9–12.7)
POTASSIUM SERPL-SCNC: 3.5 MMOL/L (ref 3.5–5.3)
PROT SERPL-MCNC: 7.3 G/DL (ref 6.4–8.2)
PROT UR STRIP-MCNC: NEGATIVE MG/DL
RBC # BLD AUTO: 5.17 MILLION/UL (ref 3.88–5.62)
SARS-COV-2 RNA RESP QL NAA+PROBE: NEGATIVE
SODIUM SERPL-SCNC: 136 MMOL/L (ref 136–145)
SP GR UR STRIP.AUTO: 1.02 (ref 1–1.03)
THC UR QL: NEGATIVE
TSH SERPL DL<=0.05 MIU/L-ACNC: 2.46 UIU/ML (ref 0.36–3.74)
UROBILINOGEN UR QL STRIP.AUTO: 0.2 E.U./DL
WBC # BLD AUTO: 11.19 THOUSAND/UL (ref 4.31–10.16)

## 2021-06-20 PROCEDURE — U0005 INFEC AGEN DETEC AMPLI PROBE: HCPCS | Performed by: EMERGENCY MEDICINE

## 2021-06-20 PROCEDURE — 99285 EMERGENCY DEPT VISIT HI MDM: CPT | Performed by: EMERGENCY MEDICINE

## 2021-06-20 PROCEDURE — 36415 COLL VENOUS BLD VENIPUNCTURE: CPT | Performed by: EMERGENCY MEDICINE

## 2021-06-20 PROCEDURE — 93005 ELECTROCARDIOGRAM TRACING: CPT

## 2021-06-20 PROCEDURE — 80053 COMPREHEN METABOLIC PANEL: CPT | Performed by: EMERGENCY MEDICINE

## 2021-06-20 PROCEDURE — 85025 COMPLETE CBC W/AUTO DIFF WBC: CPT | Performed by: EMERGENCY MEDICINE

## 2021-06-20 PROCEDURE — 81003 URINALYSIS AUTO W/O SCOPE: CPT | Performed by: EMERGENCY MEDICINE

## 2021-06-20 PROCEDURE — 80307 DRUG TEST PRSMV CHEM ANLYZR: CPT | Performed by: EMERGENCY MEDICINE

## 2021-06-20 PROCEDURE — 99285 EMERGENCY DEPT VISIT HI MDM: CPT

## 2021-06-20 PROCEDURE — U0003 INFECTIOUS AGENT DETECTION BY NUCLEIC ACID (DNA OR RNA); SEVERE ACUTE RESPIRATORY SYNDROME CORONAVIRUS 2 (SARS-COV-2) (CORONAVIRUS DISEASE [COVID-19]), AMPLIFIED PROBE TECHNIQUE, MAKING USE OF HIGH THROUGHPUT TECHNOLOGIES AS DESCRIBED BY CMS-2020-01-R: HCPCS | Performed by: EMERGENCY MEDICINE

## 2021-06-20 PROCEDURE — 82075 ASSAY OF BREATH ETHANOL: CPT | Performed by: EMERGENCY MEDICINE

## 2021-06-20 PROCEDURE — 84443 ASSAY THYROID STIM HORMONE: CPT | Performed by: EMERGENCY MEDICINE

## 2021-06-20 RX ORDER — LORAZEPAM 0.5 MG/1
0.5 TABLET ORAL ONCE
Status: COMPLETED | OUTPATIENT
Start: 2021-06-20 | End: 2021-06-20

## 2021-06-20 RX ADMIN — LORAZEPAM 0.5 MG: 0.5 TABLET ORAL at 20:17

## 2021-06-20 NOTE — LETTER
[address] [facility] [place] [place] [address]  [address] [place] [address]  Dept: [phone]                                                                    EMTALA TRANSFER CONSENT    NAME Edith Wren                                         1960                              MRN [id number]    I have been informed of my rights regarding examination, treatment, and transfer   by Dr Barbie Marie DO    Benefits: Other benefits (Include comment)_______________________ (Inpatient Psych Tx (201))    Risks: Potential for delay in receiving treatment     Consent for Transfer:  I acknowledge that my medical condition has been evaluated and explained to me by the emergency department physician or other qualified medical person and/or my attending physician, who has recommended that I be transferred to the service of  Accepting Physician: KATIE Hansen at [address] Name, [address] : [address] [facility] [address]  The above potential benefits of such transfer, the potential risks associated with such transfer, and the probable risks of not being transferred have been explained to me, and I fully understand them  The doctor has explained that, in my case, the benefits of transfer outweigh the risks  I agree to be transferred  I authorize the performance of emergency medical procedures and treatments upon me in both transit and upon arrival at the receiving facility  Additionally, I authorize the release of any and all medical records to the receiving facility and request they be transported with me, if possible  I understand that the safest mode of transportation during a medical emergency is an ambulance and that the Hospital advocates the use of this mode of transport   Risks of traveling to the receiving facility by car, including absence of medical control, life sustaining equipment, such as oxygen, and medical personnel has been explained to me and I fully understand them     ([facility])  [X]  I consent to the stated transfer and to be transported by ambulance/helicopter  [  ]  I consent to the stated transfer, but refuse transportation by ambulance and accept full responsibility for my transportation by car  I understand the risks of non-ambulance transfers and I exonerate the Hospital and its staff from any deterioration in my condition that results from this refusal     X___________________________________________    DATE  21  TIME________  Signature of patient or legally responsible individual signing on patient behalf           RELATIONSHIP TO PATIENT_________________________                        Provider Certification    NAME Lev Bland                                         1960                              MRN [id number]    A medical screening exam was performed on the above named patient  Based on the examination:    Condition Necessitating Transfer The primary encounter diagnosis was Depression  A diagnosis of Suicidal ideations was also pertinent to this visit      Patient Condition: The patient has been stabilized such that within reasonable medical probability, no material deterioration of the patient condition or the condition of the unborn child(isha) is likely to result from the transfer    Reason for Transfer: Level of Care needed not available at this facility    Transfer Requirements: [address] [facility] [address]  [address] [facility] [address]  · Space available and qualified personnel available for treatment as acknowledged by LAUREN Diaz  · Agreed to accept transfer and to provide appropriate medical treatment as acknowledged by       KATIE Miller  · Appropriate medical records of the examination and treatment of the patient are provided at the time of transfer   [address] [facility] [address] ___JG____  · Transfer will be performed by qualified personnel from [facility]  and appropriate transfer equipment as required, including the use of necessary and appropriate life support measures  Provider Certification: I have examined the patient and explained the following risks and benefits of being transferred/refusing transfer to the patient/family:  The patient is stable for psychiatric transfer because they are medically stable, and is protected from harming him/herself or others during transport      Based on these reasonable risks and benefits to the patient and/or the unborn child(isha), and based upon the information available at the time of the patients examination, I certify that the medical benefits reasonably to be expected from the provision of appropriate medical treatments at another medical facility outweigh the increasing risks, if any, to the individuals medical condition, and in the case of labor to the unborn child, from effecting the transfer      X____________________________________________ DATE 06/21/21        TIME_______      ORIGINAL - SEND TO MEDICAL RECORDS   COPY - SEND WITH PATIENT DURING TRANSFER

## 2021-06-21 ENCOUNTER — HOSPITAL ENCOUNTER (INPATIENT)
Facility: HOSPITAL | Age: 61
LOS: 15 days | Discharge: HOME | DRG: 885 | End: 2021-07-06
Attending: PSYCHIATRY & NEUROLOGY | Admitting: PSYCHIATRY & NEUROLOGY
Payer: MEDICARE

## 2021-06-21 VITALS
TEMPERATURE: 98.4 F | DIASTOLIC BLOOD PRESSURE: 77 MMHG | SYSTOLIC BLOOD PRESSURE: 140 MMHG | OXYGEN SATURATION: 97 % | HEIGHT: 70 IN | HEART RATE: 100 BPM | BODY MASS INDEX: 28.34 KG/M2 | RESPIRATION RATE: 18 BRPM | WEIGHT: 197.97 LBS

## 2021-06-21 DIAGNOSIS — F43.10 PTSD (POST-TRAUMATIC STRESS DISORDER): ICD-10-CM

## 2021-06-21 DIAGNOSIS — Z72.0 TOBACCO ABUSE: ICD-10-CM

## 2021-06-21 DIAGNOSIS — F32.A DEPRESSION: ICD-10-CM

## 2021-06-21 DIAGNOSIS — F33.42 MAJOR DEPRESSIVE DISORDER, RECURRENT, IN FULL REMISSION (HCC): Primary | ICD-10-CM

## 2021-06-21 PROBLEM — Z00.8 MEDICAL CLEARANCE FOR PSYCHIATRIC ADMISSION: Status: ACTIVE | Noted: 2021-06-21

## 2021-06-21 PROCEDURE — 99222 1ST HOSP IP/OBS MODERATE 55: CPT | Performed by: INTERNAL MEDICINE

## 2021-06-21 RX ORDER — MIRTAZAPINE 15 MG/1
7.5 TABLET, FILM COATED ORAL
Status: CANCELLED | OUTPATIENT
Start: 2021-06-21

## 2021-06-21 RX ORDER — OLANZAPINE 10 MG/1
5 INJECTION, POWDER, LYOPHILIZED, FOR SOLUTION INTRAMUSCULAR
Status: DISCONTINUED | OUTPATIENT
Start: 2021-06-21 | End: 2021-07-06 | Stop reason: HOSPADM

## 2021-06-21 RX ORDER — OLANZAPINE 5 MG/1
5 TABLET ORAL
Status: DISCONTINUED | OUTPATIENT
Start: 2021-06-21 | End: 2021-07-06 | Stop reason: HOSPADM

## 2021-06-21 RX ORDER — OLANZAPINE 10 MG/1
5 INJECTION, POWDER, LYOPHILIZED, FOR SOLUTION INTRAMUSCULAR
Status: CANCELLED | OUTPATIENT
Start: 2021-06-21

## 2021-06-21 RX ORDER — ACETAMINOPHEN 325 MG/1
975 TABLET ORAL EVERY 6 HOURS PRN
Status: CANCELLED | OUTPATIENT
Start: 2021-06-21

## 2021-06-21 RX ORDER — BENZTROPINE MESYLATE 0.5 MG/1
0.5 TABLET ORAL
Status: DISCONTINUED | OUTPATIENT
Start: 2021-06-21 | End: 2021-07-06 | Stop reason: HOSPADM

## 2021-06-21 RX ORDER — ACETAMINOPHEN 325 MG/1
650 TABLET ORAL EVERY 4 HOURS PRN
Status: DISCONTINUED | OUTPATIENT
Start: 2021-06-21 | End: 2021-07-06 | Stop reason: HOSPADM

## 2021-06-21 RX ORDER — BENZTROPINE MESYLATE 1 MG/ML
1 INJECTION INTRAMUSCULAR; INTRAVENOUS
Status: CANCELLED | OUTPATIENT
Start: 2021-06-21

## 2021-06-21 RX ORDER — LORAZEPAM 1 MG/1
1 TABLET ORAL
Status: CANCELLED | OUTPATIENT
Start: 2021-06-21

## 2021-06-21 RX ORDER — ACETAMINOPHEN 325 MG/1
650 TABLET ORAL EVERY 4 HOURS PRN
Status: CANCELLED | OUTPATIENT
Start: 2021-06-21

## 2021-06-21 RX ORDER — LORAZEPAM 1 MG/1
1 TABLET ORAL
Status: DISCONTINUED | OUTPATIENT
Start: 2021-06-21 | End: 2021-07-06 | Stop reason: HOSPADM

## 2021-06-21 RX ORDER — AMOXICILLIN 250 MG
1 CAPSULE ORAL DAILY PRN
Status: CANCELLED | OUTPATIENT
Start: 2021-06-21

## 2021-06-21 RX ORDER — AMOXICILLIN 250 MG
1 CAPSULE ORAL DAILY PRN
Status: DISCONTINUED | OUTPATIENT
Start: 2021-06-21 | End: 2021-07-06 | Stop reason: HOSPADM

## 2021-06-21 RX ORDER — NICOTINE 21 MG/24HR
21 PATCH, TRANSDERMAL 24 HOURS TRANSDERMAL DAILY
Status: DISCONTINUED | OUTPATIENT
Start: 2021-06-22 | End: 2021-07-06 | Stop reason: HOSPADM

## 2021-06-21 RX ORDER — LORAZEPAM 2 MG/ML
1 INJECTION INTRAMUSCULAR
Status: CANCELLED | OUTPATIENT
Start: 2021-06-21

## 2021-06-21 RX ORDER — LORAZEPAM 2 MG/ML
1 INJECTION INTRAMUSCULAR
Status: DISCONTINUED | OUTPATIENT
Start: 2021-06-21 | End: 2021-07-06 | Stop reason: HOSPADM

## 2021-06-21 RX ORDER — MIRTAZAPINE 7.5 MG/1
7.5 TABLET, FILM COATED ORAL
Status: DISCONTINUED | OUTPATIENT
Start: 2021-06-21 | End: 2021-07-06 | Stop reason: HOSPADM

## 2021-06-21 RX ORDER — BENZTROPINE MESYLATE 1 MG/ML
1 INJECTION INTRAMUSCULAR; INTRAVENOUS
Status: DISCONTINUED | OUTPATIENT
Start: 2021-06-21 | End: 2021-07-06 | Stop reason: HOSPADM

## 2021-06-21 RX ORDER — LORAZEPAM 0.5 MG/1
0.5 TABLET ORAL
Status: DISCONTINUED | OUTPATIENT
Start: 2021-06-21 | End: 2021-07-06 | Stop reason: HOSPADM

## 2021-06-21 RX ORDER — MAGNESIUM HYDROXIDE/ALUMINUM HYDROXICE/SIMETHICONE 120; 1200; 1200 MG/30ML; MG/30ML; MG/30ML
30 SUSPENSION ORAL EVERY 4 HOURS PRN
Status: DISCONTINUED | OUTPATIENT
Start: 2021-06-21 | End: 2021-07-06 | Stop reason: HOSPADM

## 2021-06-21 RX ORDER — BENZTROPINE MESYLATE 1 MG/1
0.5 TABLET ORAL
Status: CANCELLED | OUTPATIENT
Start: 2021-06-21

## 2021-06-21 RX ORDER — FLUTICASONE FUROATE AND VILANTEROL 100; 25 UG/1; UG/1
1 POWDER RESPIRATORY (INHALATION) DAILY
Status: DISCONTINUED | OUTPATIENT
Start: 2021-06-22 | End: 2021-07-06 | Stop reason: HOSPADM

## 2021-06-21 RX ORDER — MAGNESIUM HYDROXIDE/ALUMINUM HYDROXICE/SIMETHICONE 120; 1200; 1200 MG/30ML; MG/30ML; MG/30ML
30 SUSPENSION ORAL EVERY 4 HOURS PRN
Status: CANCELLED | OUTPATIENT
Start: 2021-06-21

## 2021-06-21 RX ORDER — ALBUTEROL SULFATE 90 UG/1
2 AEROSOL, METERED RESPIRATORY (INHALATION) EVERY 6 HOURS PRN
Status: DISCONTINUED | OUTPATIENT
Start: 2021-06-21 | End: 2021-07-06 | Stop reason: HOSPADM

## 2021-06-21 RX ORDER — OLANZAPINE 2.5 MG/1
5 TABLET ORAL
Status: CANCELLED | OUTPATIENT
Start: 2021-06-21

## 2021-06-21 RX ORDER — ACETAMINOPHEN 325 MG/1
975 TABLET ORAL EVERY 6 HOURS PRN
Status: DISCONTINUED | OUTPATIENT
Start: 2021-06-21 | End: 2021-07-06 | Stop reason: HOSPADM

## 2021-06-21 RX ORDER — LORAZEPAM 0.5 MG/1
0.5 TABLET ORAL
Status: CANCELLED | OUTPATIENT
Start: 2021-06-21
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
## 2021-06-21 NOTE — PLAN OF CARE
Problem: Risk for Self Injury/Neglect  Goal: Treatment Goal: Remain safe during length of stay, learn and adopt new coping skills, and be free of self-injurious ideation, impulses and acts at the time of discharge  Outcome: Progressing     Problem: Depression  Goal: Treatment Goal: Demonstrate behavioral control of depressive symptoms, verbalize feelings of improved mood/affect, and adopt new coping skills prior to discharge  Outcome: Progressing  Goal: Verbalize thoughts and feelings  Description: Interventions:  - Assess and re-assess patient's level of risk   - Engage patient in 1:1 interactions, daily, for a minimum of 15 minutes   - Encourage patient to express feelings, fears, frustrations, hopes   Outcome: Progressing  Goal: Refrain from harming self  Description: Interventions:  - Monitor patient closely, per order   - Supervise medication ingestion, monitor effects and side effects   Outcome: Progressing  Goal: Refrain from isolation  Description: Interventions:  - Develop a trusting relationship   - Encourage socialization   Outcome: Progressing  Goal: Refrain from self-neglect  Outcome: Progressing  Goal: Attend and participate in unit activities, including therapeutic, recreational, and educational groups  Description: Interventions:  - Provide therapeutic and educational activities daily, encourage attendance and participation, and document same in the medical record   Outcome: Progressing  Goal: Complete daily ADLs, including personal hygiene independently, as able  Description: Interventions:  - Observe, teach, and assist patient with ADLS  -  Monitor and promote a balance of rest/activity, with adequate nutrition and elimination   Outcome: Progressing     Problem: Anxiety  Goal: Anxiety is at manageable level  Description: Interventions:  - Assess and monitor patient's anxiety level     - Monitor for signs and symptoms (heart palpitations, chest pain, shortness of breath, headaches, nausea, feeling jumpy, restlessness, irritable, apprehensive)  - Collaborate with interdisciplinary team and initiate plan and interventions as ordered    - [place] patient to unit/surroundings  - Explain treatment plan  - Encourage participation in care  - Encourage verbalization of concerns/fears  - Identify coping mechanisms  - Assist in developing anxiety-reducing skills  - Administer/offer alternative therapies  - Limit or eliminate stimulants  Outcome: Progressing

## 2021-06-21 NOTE — ED NOTES
Patient is accepted at [facility]  Patient is accepted by KATIE Morales per Maria Dolores Vargas in Intake  Transportation is arranged with [facility] per Ora De Santiago at [facility]  Transportation is scheduled for 4pm      Nurse report is to be called to [phone] prior to patient transfer      Abhilash Khan LMSW  06/21/21  2725

## 2021-06-21 NOTE — ED NOTES
Pt sleeping at this time  Will obtain vital signs when he wakes up        Everett Encinas RN  06/21/21 0285

## 2021-06-21 NOTE — ED PROVIDER NOTES
History  Chief Complaint   Patient presents with    Psychiatric Evaluation     Patient reports SI x 3 days, hx of the same  History provided by:  Patient   used: No    Psychiatric Evaluation  Presenting symptoms: depression and suicidal thoughts    Patient accompanied by: friend  Degree of incapacity (severity):  Severe  Onset quality:  Gradual  Timing:  Constant  Progression:  Worsening  Chronicity:  Recurrent  Treatment compliance:  Untreated  Relieved by:  Nothing  Worsened by:  Nothing  Ineffective treatments:  None tried  Associated symptoms: no abdominal pain and no chest pain        Prior to Admission Medications   Prescriptions Last Dose Informant Patient Reported? Taking? albuterol (Ventolin HFA) 90 mcg/act inhaler   No Yes   Sig: Inhale 2 puffs every 6 (six) hours as needed for wheezing   fluticasone-umeclidinium-vilanterol (Trelegy Ellipta) 100-62 5-25 MCG/INH inhaler   No Yes   Sig: Inhale 1 puff daily Rinse mouth after use  Facility-Administered Medications: None       Past Medical History:   Diagnosis Date    Depression     Lung nodules     PTSD (post-traumatic stress disorder)     Tobacco abuse        History reviewed  No pertinent surgical history  Family History   Problem Relation Age of Onset    Cancer Mother     Diabetes Father     Heart disease Father      I have reviewed and agree with the history as documented  E-Cigarette/Vaping     E-Cigarette/Vaping Substances     Social History     Tobacco Use    Smoking status: Heavy Tobacco Smoker     Packs/day: 2 00     Types: Cigarettes    Smokeless tobacco: Current User    Tobacco comment: 1 cigar once a month   Substance Use Topics    Alcohol use: Not Currently    Drug use: Never       Review of Systems   Constitutional: Negative for chills and fever  HENT: Negative for ear pain and sore throat  Eyes: Negative for pain and visual disturbance     Respiratory: Negative for cough and shortness of breath  Cardiovascular: Negative for chest pain and palpitations  Gastrointestinal: Negative for abdominal pain and vomiting  Genitourinary: Negative for dysuria and hematuria  Musculoskeletal: Negative for arthralgias and back pain  Skin: Negative for color change and rash  Neurological: Negative for seizures and syncope  Psychiatric/Behavioral: Positive for decreased concentration, dysphoric mood, sleep disturbance and suicidal ideas  All other systems reviewed and are negative  Physical Exam  Physical Exam  Vitals and nursing note reviewed  Constitutional:       Appearance: He is well-developed  HENT:      Head: Normocephalic and atraumatic  Eyes:      Conjunctiva/sclera: Conjunctivae normal    Cardiovascular:      Rate and Rhythm: Normal rate and regular rhythm  Heart sounds: No murmur heard  Pulmonary:      Effort: Pulmonary effort is normal  No respiratory distress  Breath sounds: Normal breath sounds  Abdominal:      Palpations: Abdomen is soft  Tenderness: There is no abdominal tenderness  Musculoskeletal:      Cervical back: Neck supple  Skin:     General: Skin is warm and dry  Capillary Refill: Capillary refill takes less than 2 seconds  Neurological:      General: No focal deficit present  Mental Status: He is alert and oriented to person, place, and time     Psychiatric:      Comments: Depressed mood, poor eye contact, admits to SI         Vital Signs  ED Triage Vitals   Temperature Pulse Respirations Blood Pressure SpO2   06/20/21 1935 06/20/21 1935 06/20/21 1935 06/20/21 1935 06/20/21 1935   98 4 °F (36 9 °C) (!) 118 18 137/94 92 %      Temp Source Heart Rate Source Patient Position - Orthostatic VS BP Location FiO2 (%)   06/20/21 1935 06/20/21 1935 06/20/21 1935 06/20/21 1935 --   Oral Monitor Sitting Left arm       Pain Score       06/21/21 0000       No Pain           Vitals:    06/20/21 2358 06/21/21 0000 06/21/21 1111 06/21/21 1554 BP: 148/71 136/68 132/76 140/77   Pulse: 91 80 82 100   Patient Position - Orthostatic VS: Lying Lying Lying Sitting         Visual Acuity      ED Medications  Medications   LORazepam (ATIVAN) tablet 0 5 mg (0 5 mg Oral Given 6/20/21 2017)       Diagnostic Studies  Results Reviewed     Procedure Component Value Units Date/Time    Novel Coronavirus (Covid-19),PCR [facility] - 2 Hour Stat [[id number]]  (Normal) Collected: 06/20/21 2028    Lab Status: Final result Specimen: Nares from Nose Updated: 06/20/21 2126     SARS-CoV-2 Negative    Narrative: The specimen collection materials, transport medium, and/or testing methodology utilized in the production of these test results have been proven to be reliable in a limited validation with an abbreviated program under the Emergency Utilization Authorization provided by the FDA  Testing reported as "Presumptive positive" will be confirmed with secondary testing to ensure result accuracy  Clinical caution and judgement should be used with the interpretation of these results with consideration of the clinical impression and other laboratory testing  Testing reported as "Positive" or "Negative" has been proven to be accurate according to standard laboratory validation requirements  All testing is performed with control materials showing appropriate reactivity at standard intervals  Rapid drug screen, urine [[id number]]  (Normal) Collected: 06/20/21 2054    Lab Status: Final result Specimen: Urine, Clean Catch Updated: 06/20/21 2119     Amph/Meth UR Negative     Barbiturate Ur Negative     Benzodiazepine Urine Negative     Cocaine Urine Negative     Methadone Urine Negative     Opiate Urine Negative     PCP Ur Negative     THC Urine Negative     Oxycodone Urine Negative    Narrative:      FOR MEDICAL PURPOSES ONLY  IF CONFIRMATION NEEDED PLEASE CONTACT THE LAB WITHIN 5 DAYS      Drug Screen Cutoff Levels:  AMPHETAMINE/METHAMPHETAMINES  1000 ng/mL  BARBITURATES     200 ng/mL  BENZODIAZEPINES     200 ng/mL  COCAINE      300 ng/mL  METHADONE      300 ng/mL  OPIATES      300 ng/mL  PHENCYCLIDINE     25 ng/mL  THC       50 ng/mL  OXYCODONE      100 ng/mL    UA (URINE) with reflex to Scope [[id number]] Collected: 06/20/21 2054    Lab Status: Final result Specimen: Urine, Clean Catch Updated: 06/20/21 2106     Color, UA Yellow     Clarity, UA Clear     Specific Gravity, UA 1 025     pH, UA 5 5     Leukocytes, UA Negative     Nitrite, UA Negative     Protein, UA Negative mg/dl      Glucose, UA Negative mg/dl      Ketones, UA Negative mg/dl      Urobilinogen, UA 0 2 E U /dl      Bilirubin, UA Negative     Blood, UA Negative    TSH [[id number]]  (Normal) Collected: 06/20/21 2028    Lab Status: Final result Specimen: Blood from Hand, Right Updated: 06/20/21 2103     TSH 3RD GENERATON 2 459 uIU/mL     Narrative:      Patients undergoing fluorescein dye angiography may retain small amounts of fluorescein in the body for 48-72 hours post procedure  Samples containing fluorescein can produce falsely depressed TSH values  If the patient had this procedure,a specimen should be resubmitted post fluorescein clearance        Comprehensive metabolic panel [[id number]] Collected: 06/20/21 2028    Lab Status: Final result Specimen: Blood from Hand, Right Updated: 06/20/21 2055     Sodium 136 mmol/L      Potassium 3 5 mmol/L      Chloride 103 mmol/L      CO2 22 mmol/L      ANION GAP 11 mmol/L      BUN 8 mg/dL      Creatinine 0 95 mg/dL      Glucose 87 mg/dL      Calcium 8 7 mg/dL      AST 18 U/L      ALT 23 U/L      Alkaline Phosphatase 91 U/L      Total Protein 7 3 g/dL      Albumin 4 1 g/dL      Total Bilirubin 0 54 mg/dL      eGFR 87 ml/min/1 73sq m     Narrative:      Melony guidelines for Chronic Kidney Disease (CKD):     Stage 1 with normal or high GFR (GFR > 90 mL/min/1 73 square meters)    Stage 2 Mild CKD (GFR = 60-89 mL/min/1 73 square meters)    Stage 3A Moderate CKD (GFR = 45-59 mL/min/1 73 square meters)    Stage 3B Moderate CKD (GFR = 30-44 mL/min/1 73 square meters)    Stage 4 Severe CKD (GFR = 15-29 mL/min/1 73 square meters)    Stage 5 End Stage CKD (GFR <15 mL/min/1 73 square meters)  Note: GFR calculation is accurate only with a steady state creatinine    CBC and differential [[id number]]  (Abnormal) Collected: 06/20/21 2028    Lab Status: Final result Specimen: Blood from Hand, Right Updated: 06/20/21 2034     WBC 11 19 Thousand/uL      RBC 5 17 Million/uL      Hemoglobin 16 0 g/dL      Hematocrit 46 5 %      MCV 90 fL      MCH 30 9 pg      MCHC 34 4 g/dL      RDW 12 8 %      MPV 8 7 fL      Platelets 215 Thousands/uL      nRBC 0 /100 WBCs      Neutrophils Relative 76 %      Immat GRANS % 0 %      Lymphocytes Relative 15 %      Monocytes Relative 8 %      Eosinophils Relative 1 %      Basophils Relative 0 %      Neutrophils Absolute 8 37 Thousands/µL      Immature Grans Absolute 0 04 Thousand/uL      Lymphocytes Absolute 1 71 Thousands/µL      Monocytes Absolute 0 93 Thousand/µL      Eosinophils Absolute 0 10 Thousand/µL      Basophils Absolute 0 04 Thousands/µL     POCT alcohol breath test [[id number]]  (Normal) Resulted: 06/20/21 2023    Lab Status: Final result Updated: 06/20/21 2023     EXTBreath Alcohol 0 000                 No orders to display              Procedures  Procedures         ED Course  ED Course as of Jul 06 1937   Sun Jun 20, 2021 2045 EKG reviewed by me, normal sinus rhythm at rate of 91, normal axis, normal intervals, no acute ST changes to suggest cardiac ischemia  SBIRT 22yo+      Most Recent Value   SBIRT (22 yo +)   In order to provide better care to our patients, we are screening all of our patients for alcohol and drug use  Would it be okay to ask you these screening questions? Yes Filed at: 06/21/2021 6413   Initial Alcohol Screen: US AUDIT-C    1   How often do you have a drink containing alcohol?  0 Filed at: 06/21/2021 0026   2  How many drinks containing alcohol do you have on a typical day you are drinking? 0 Filed at: 06/21/2021 0026   3a  Male UNDER 65: How often do you have five or more drinks on one occasion? 0 Filed at: 06/21/2021 0026   3b  FEMALE Any Age, or MALE 65+: How often do you have 4 or more drinks on one occassion? 0 Filed at: 06/21/2021 0026   Audit-C Score  0 Filed at: 06/21/2021 8332   EDGAR: How many times in the past year have you    Used an illegal drug or used a prescription medication for non-medical reasons? Never Filed at: 06/21/2021 0026                    MDM  Number of Diagnoses or Management Options  Depression: new and requires workup  Suicidal ideations: new and requires workup     Amount and/or Complexity of Data Reviewed  Clinical lab tests: ordered and reviewed  Review and summarize past medical records: yes  Discuss the patient with other providers: yes  Independent visualization of images, tracings, or specimens: yes        Disposition  Final diagnoses:   Depression   Suicidal ideations     Time reflects when diagnosis was documented in both MDM as applicable and the Disposition within this note     Time User Action Codes Description Comment    6/20/2021  8:09 PM Javed Jordan Add [F32 9] Depression     6/20/2021  8:09 PM Javed Jordan Add [H91 263] Suicidal ideations       ED Disposition     ED Disposition Condition Date/Time Comment    Transfer to [facility] Jun 20, 2021  8:09 PM Anisha Ayala should be transferred out to [facility] and has been medically cleared          MD Documentation      Most Recent Value   Patient Condition  The patient has been stabilized such that within reasonable medical probability, no material deterioration of the patient condition or the condition of the unborn child(isha) is likely to result from the transfer   Reason for Transfer  Level of Care needed not available at this facility   Benefits of Transfer  Other benefits (Include comment)_______________________ Mayra Bills [facility] Tx (201)]   Risks of Transfer  Potential for delay in receiving treatment   Accepting Physician  Holly Daniels Name, [address] [facility]    (Name & Tel number)  LAUREN Stacy   Transported by [employer] and Unit #)  [facility]   Sending MD Dr Brenda Heath   Provider Certification  The patient is stable for psychiatric transfer because they are medically stable, and is protected from harming him/herself or others during transport      RN Documentation      Most Recent Value   Accepting Facility Name, [address] [facility]    (Name & Tel number)  LAUREN Stacy   Transported by [employer] and Unit #)  [facility]      Follow-up Information    None         Discharge Medication List as of 6/21/2021  4:18 PM      CONTINUE these medications which have NOT CHANGED    Details   albuterol (Ventolin HFA) 90 mcg/act inhaler Inhale 2 puffs every 6 (six) hours as needed for wheezing, Starting Mon 8/17/2020, Normal      fluticasone-umeclidinium-vilanterol (Trelegy Ellipta) 100-62 5-25 MCG/INH inhaler Inhale 1 puff daily Rinse mouth after use , Starting Wed 2/3/2021, Normal           No discharge procedures on file      PDMP Review     None          ED Provider  Electronically Signed by           Diane Vincent MD  07/06/21 8951

## 2021-06-21 NOTE — ED NOTES
Call placed to [facility], spoke with Bert Lu, who indicated that they no beds at this time       Xiomara Sylvester IRENE  06/21/21  9353

## 2021-06-21 NOTE — ED NOTES
CW attempted to meet with pt, but he was coughing considerably and CW could hear pt wheezing  Tech Luh provided pt with his Ventolin Inhaler, and pt took 2 inhalations  Pt continued to cough and CW could not engage in an evaluation at this time  CW asked pt if he needed more assistance to breathe than what his inhaler provided, and he nodded yes  CW informed Dr Georgette Watt of the above as well as pt's nurse Franci Kapadia  CW will return to assess pt after he is feeling better      [facility], [address]  06/20/21 20:59

## 2021-06-21 NOTE — ED NOTES
Both pt and Dr Sung Varma signed the 201 document  As per merced [place], there are are no in-network beds tonight  CW faxed pt's 201 to [facility] for AM review      [facility], [facility]  06/20/21 23:33

## 2021-06-21 NOTE — ED NOTES
Pt given Fixodent for his dentures, which was placed with Pt belongings after use        Lacretia Harden  06/21/21 5790
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
## 2021-06-22 PROBLEM — F39 UNSPECIFIED MOOD (AFFECTIVE) DISORDER (HCC): Status: ACTIVE | Noted: 2021-06-22

## 2021-06-22 LAB
ATRIAL RATE: 91 BPM
P AXIS: 68 DEGREES
PR INTERVAL: 168 MS
QRS AXIS: 76 DEGREES
QRSD INTERVAL: 84 MS
QT INTERVAL: 358 MS
QTC INTERVAL: 440 MS
T WAVE AXIS: 75 DEGREES
VENTRICULAR RATE: 91 BPM

## 2021-06-22 PROCEDURE — 93010 ELECTROCARDIOGRAM REPORT: CPT | Performed by: INTERNAL MEDICINE

## 2021-06-22 PROCEDURE — 99222 1ST HOSP IP/OBS MODERATE 55: CPT | Performed by: PSYCHIATRY & NEUROLOGY

## 2021-06-22 RX ORDER — FLUOXETINE HYDROCHLORIDE 20 MG/1
20 CAPSULE ORAL DAILY
Status: DISCONTINUED | OUTPATIENT
Start: 2021-06-22 | End: 2021-07-06 | Stop reason: HOSPADM

## 2021-06-22 RX ORDER — OLANZAPINE 10 MG/1
10 TABLET ORAL
Status: DISCONTINUED | OUTPATIENT
Start: 2021-06-22 | End: 2021-06-28

## 2021-06-22 RX ORDER — PRAZOSIN HYDROCHLORIDE 1 MG/1
1 CAPSULE ORAL
Status: DISCONTINUED | OUTPATIENT
Start: 2021-06-22 | End: 2021-07-06 | Stop reason: HOSPADM

## 2021-06-22 RX ADMIN — ALBUTEROL SULFATE 2 PUFF: 90 AEROSOL, METERED RESPIRATORY (INHALATION) at 08:02

## 2021-06-22 RX ADMIN — PHENYTOIN 1 MG: 125 SUSPENSION ORAL at 21:37

## 2021-06-22 RX ADMIN — NICOTINE 21 MG: 21 PATCH, EXTENDED RELEASE TRANSDERMAL at 19:01

## 2021-06-22 RX ADMIN — OLANZAPINE 10 MG: 10 TABLET, FILM COATED ORAL at 21:37

## 2021-06-22 RX ADMIN — FLUOXETINE 20 MG: 20 CAPSULE ORAL at 15:56

## 2021-06-22 RX ADMIN — FLUTICASONE FUROATE AND VILANTEROL TRIFENATATE 1 PUFF: 100; 25 POWDER RESPIRATORY (INHALATION) at 08:32

## 2021-06-22 RX ADMIN — NICOTINE 21 MG: 21 PATCH, EXTENDED RELEASE TRANSDERMAL at 08:31

## 2021-06-22 RX ADMIN — ALBUTEROL SULFATE 2 PUFF: 90 AEROSOL, METERED RESPIRATORY (INHALATION) at 16:03

## 2021-06-22 NOTE — DISCHARGE INSTR - APPOINTMENTS
Kendra Park RN, our [employer], will be calling you after your discharge, on the phone number that you provided  She will be available as an additional support, if needed  If you wish to speak with her, you may contact Annette Owen at [phone]

## 2021-06-22 NOTE — PLAN OF CARE
Pt engaged in 2 of 3 groups and expressed feeling more connected with unit peers by the end of the day,after having felt "too young" to be on the unit initially    Problem: Ineffective Coping  Goal: Participates in unit activities  Description: Interventions:  - Provide therapeutic environment   - Provide required programming   - Redirect inappropriate behaviors   Outcome: Progressing

## 2021-06-22 NOTE — NURSING NOTE
Patient calm and cooperative on approach, denies s/s  Patient in and out of common areas with minimal interaction with peers  Patient compliant with medications and meals in the shift

## 2021-06-22 NOTE — ASSESSMENT & PLAN NOTE
 The patient was evaluated and is medically clear for admission to the [facility] for treatment of the underlying psychiatric illness

## 2021-06-22 NOTE — DISCHARGE INSTR - OTHER ORDERS
You are being discharged to: [facility] [address]      Triggers you have identified during your hospitalization that led to your admission include: grief/loss, relationship issue, and lack of medication/OP treatment  Coping skills you have identified during your hospitalization include: playing cards and reading  If you are unable to deal with your distressed mood alone, please contact your outpatient provider at [facility] Psychiatry  If that is not effective and you continue to have suicidal ideation, a distressed mood, or feel like you're in crisis, please contact 911 and go to the nearest emergency center  BEHAVIORAL MEDICINE AT [facility] Crisis Intervention: 802.385.8244 or 1-320.141.4846  *National Suicide Prevention Lifeline:  8-668.749.1440  *Alcohol Anonymous: [address] on [address] [facility] ([facility]) HELPLINE: 877.509.4272/Website: www broderick org  *Substance Abuse and [address] [facility]) [employer], which is a confidential, free, 24-hour-a-day, 365-day-a-year, information service for individuals and family members facing mental health and/or substance use disorders  This service provides referrals to local treatment facilities, support groups, and community-based organizations  Callers can also order free publications and other information  Call 6-536.888.3600/Website: www [facility] gov  *United Way 2-1-1: This is a toll free, confidential, 24-hour-a-day service which connects you to a community  in your area who can help you find services and resources that are available to you locally and provide critical services that can improve and save lives  Call: 211  /Website: https://cadetV[employer]ruedel rosario net/      A referral was made on your behalf through [address] -- Renetta Lamb   Upon receipt of your application and written evidence of disability, you will be notified within 15 business days of your eligibility for the PWD and any other possible funding sources available to you for transportation purposes  Once enrolled, you may phone our dispatch office at P: [phone] to schedule your ride  Reservations are taken no less than 2 business days prior to service but can be made up to two weeks in advance  For questions regarding times and scheduling, please phone dispatch at [phone]

## 2021-06-22 NOTE — ASSESSMENT & PLAN NOTE
· CT scan (3/28/2021): Multiple tiny increased densities within the right mainstem bronchus which may represent mucous  Follow-up with pulmonary is recommended  Diffuse emphysematous changes within the lungs  Mild prominent mediastinal lymphadenopathy    · Patient follows with a pulmonologist  · Routine screening with imaging

## 2021-06-22 NOTE — CONSULTS
[address] 1960, 62 y o  male MRN: [id number]  Unit/Bed#: [facility] 213-73 Encounter: [id number]  Primary Care Provider: Lindsay Allred MD   Date and time admitted to hospital: 6/21/2021  5:25 PM    Inpatient consult for Medical Clearance for [facility] patient  Consult performed by: Nevaeh Quinones PA-C  Consult ordered by: KATIE Moody          Medical clearance for psychiatric admission  Assessment & Plan   The patient was evaluated and is medically clear for admission to the [facility] for treatment of the underlying psychiatric illness  Tobacco abuse  Assessment & Plan  · Patient reports smoking 1-2 packs of cigarettes daily  · Supplement with nicotine patch  · Encouraged cessation    Lung nodules  Assessment & Plan  · CT scan (3/28/2021): Multiple tiny increased densities within the right mainstem bronchus which may represent mucous  Follow-up with pulmonary is recommended  Diffuse emphysematous changes within the lungs  Mild prominent mediastinal lymphadenopathy  · Patient follows with a pulmonologist  · Routine screening with imaging     Simple chronic bronchitis ([address] )  Assessment & Plan  · No acute exacerbation  · Patient follows outpatient with pulmonology  · Continue home Breo-ellipta and albuterol inhalers      ECT Clearance:   History of recent seizure or stroke:  No   History of pheochromocytoma:  No   History of active bleeding (Intracranial hemorrhage, aneurysm or AVM):  No   History of metallic implants in the head or neck:  No   History of increased intracranial pressure with mass effect:  No   Does the patient have a current arrhythmia? No      Based on above criteria, Patient is medically cleared for ECT should it be recommended  Counseling / Coordination of Care Time: 45 minutes  Greater than 50% of total time spent on patient counseling and coordination of care  Collaboration of Care:  Were Recommendations Directly Discussed with Primary Treatment Team? - Yes     History of Present Illness:    Pepper Hirsch is a 62 y o  male who is originally admitted to the psychiatry service due to suicidal ideations  We are consulted for medical clearance for admission to [facility] and treatment of underlying psychiatric illness  Patient has a PMH significant for simple chronic bronchitis, tobacco abuse, pulmonary lung nodules  Patient currently denies any headaches, dizziness, chest pain, shortness of breath, nausea/vomiting/diarrhea, urinary symptoms at this time  Patient has a psychiatric history significant for depression and anxiety  No other medical complaints at this time  Review of Systems:    Review of Systems   Constitutional: Negative for chills and fever  HENT: Negative for congestion, ear pain, rhinorrhea and sore throat  Eyes: Negative for pain and visual disturbance  Respiratory: Negative for cough and shortness of breath  Cardiovascular: Negative for chest pain and palpitations  Gastrointestinal: Negative for abdominal pain, constipation, diarrhea, nausea and vomiting  Genitourinary: Negative for dysuria, hematuria and urgency  Musculoskeletal: Negative for arthralgias, back pain and myalgias  Skin: Negative for color change and rash  Neurological: Negative for dizziness, seizures, syncope and headaches  Psychiatric/Behavioral: Positive for dysphoric mood and suicidal ideas  The patient is nervous/anxious  All other systems reviewed and are negative  Past Medical and Surgical History:     Past Medical History:   Diagnosis Date    Depression     Lung nodules     PTSD (post-traumatic stress disorder)     Tobacco abuse        No past surgical history on file  Meds/Allergies:    all medications and allergies reviewed    Allergies:    Allergies   Allergen Reactions    Chocolate - Food Allergy     Tomato - Food Allergy        Social History:     Marital Status: Single    Substance Use History:   Social History     Substance and Sexual Activity   Alcohol Use Not Currently     Social History     Tobacco Use   Smoking Status Heavy Tobacco Smoker    Packs/day: 2 00    Types: Cigarettes   Smokeless Tobacco Current User   Tobacco Comment    1 cigar once a month     Social History     Substance and Sexual Activity   Drug Use Never       Family History:    Family History   Problem Relation Age of Onset    Cancer Mother     Diabetes Father     Heart disease Father        Physical Exam:     Vitals:   Blood Pressure: 119/68 (06/21/21 1809)  Pulse: 83 (06/21/21 1809)  Temperature: 98 3 °F (36 8 °C) (06/21/21 1809)  Temp Source: Temporal (06/21/21 1809)  Respirations: 18 (06/21/21 1809)  Height: 5' 10" (177 8 cm) (06/21/21 1809)  Weight - Scale: 89 4 kg (197 lb) (06/21/21 1809)  SpO2: 92 % (06/21/21 1809)    Physical Exam  Vitals reviewed  Constitutional:       General: He is not in acute distress  Appearance: He is normal weight  HENT:      Head: Normocephalic and atraumatic  Nose: No congestion or rhinorrhea  Mouth/Throat:      Mouth: Mucous membranes are moist       Pharynx: Oropharynx is clear  Eyes:      General: No scleral icterus  Pupils: Pupils are equal, round, and reactive to light  Cardiovascular:      Rate and Rhythm: Normal rate and regular rhythm  Pulses: Normal pulses  Heart sounds: No murmur heard  Pulmonary:      Effort: Pulmonary effort is normal  No respiratory distress  Breath sounds: No wheezing or rales  Comments: Decreased breath sounds throughout all lung fields  Abdominal:      General: Bowel sounds are normal  There is no distension  Palpations: Abdomen is soft  Tenderness: There is no abdominal tenderness  Musculoskeletal:         General: Normal range of motion  Right lower leg: No edema  Left lower leg: No edema  Skin:     General: Skin is warm and dry  Findings: No rash  Neurological:      Mental Status: He is alert and oriented to person, place, and time  Psychiatric:         Mood and Affect: Mood is depressed  Thought Content: Thought content includes suicidal ideation  Additional Data:     Lab Results: I have personally reviewed pertinent reports  Results from last 7 days   Lab Units 06/20/21 2028   WBC Thousand/uL 11 19*   HEMOGLOBIN g/dL 16 0   HEMATOCRIT % 46 5   PLATELETS Thousands/uL 245   NEUTROS PCT % 76*   LYMPHS PCT % 15   MONOS PCT % 8   EOS PCT % 1     Results from last 7 days   Lab Units 06/20/21 2028   SODIUM mmol/L 136   POTASSIUM mmol/L 3 5   CHLORIDE mmol/L 103   CO2 mmol/L 22   BUN mg/dL 8   CREATININE mg/dL 0 95   ANION GAP mmol/L 11   CALCIUM mg/dL 8 7   ALBUMIN g/dL 4 1   TOTAL BILIRUBIN mg/dL 0 54   ALK PHOS U/L 91   ALT U/L 23   AST U/L 18   GLUCOSE RANDOM mg/dL 87             Lab Results   Component Value Date/Time    HGBA1C 4 9 04/11/2019 10:02 AM           EKG, Pathology, and Other Studies Reviewed on Admission:   · EKG: None new  · CT chest (3/28/2019): Multiple tiny increased densities within the right mainstem bronchus which may represent mucous  Follow-up with pulmonary is recommended  Diffuse emphysematous changes within the lungs  Mild prominent mediastinal lymphadenopathy  ** Please Note: This note has been constructed using a voice recognition system   **
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
## 2021-06-22 NOTE — PLAN OF CARE
Problem: Risk for Self Injury/Neglect  Goal: Treatment Goal: Remain safe during length of stay, learn and adopt new coping skills, and be free of self-injurious ideation, impulses and acts at the time of discharge  Outcome: Progressing     Problem: Depression  Goal: Treatment Goal: Demonstrate behavioral control of depressive symptoms, verbalize feelings of improved mood/affect, and adopt new coping skills prior to discharge  Outcome: Progressing  Goal: Verbalize thoughts and feelings  Description: Interventions:  - Assess and re-assess patient's level of risk   - Engage patient in 1:1 interactions, daily, for a minimum of 15 minutes   - Encourage patient to express feelings, fears, frustrations, hopes   Outcome: Progressing  Goal: Refrain from harming self  Description: Interventions:  - Monitor patient closely, per order   - Supervise medication ingestion, monitor effects and side effects   Outcome: Progressing  Goal: Refrain from isolation  Description: Interventions:  - Develop a trusting relationship   - Encourage socialization   Outcome: Progressing  Goal: Refrain from self-neglect  Outcome: Progressing  Goal: Attend and participate in unit activities, including therapeutic, recreational, and educational groups  Description: Interventions:  - Provide therapeutic and educational activities daily, encourage attendance and participation, and document same in the medical record   Outcome: Progressing  Goal: Complete daily ADLs, including personal hygiene independently, as able  Description: Interventions:  - Observe, teach, and assist patient with ADLS  -  Monitor and promote a balance of rest/activity, with adequate nutrition and elimination   Outcome: Progressing     Problem: Anxiety  Goal: Anxiety is at manageable level  Description: Interventions:  - Assess and monitor patient's anxiety level     - Monitor for signs and symptoms (heart palpitations, chest pain, shortness of breath, headaches, nausea, feeling jumpy, restlessness, irritable, apprehensive)  - Collaborate with interdisciplinary team and initiate plan and interventions as ordered    - [place] patient to unit/surroundings  - Explain treatment plan  - Encourage participation in care  - Encourage verbalization of concerns/fears  - Identify coping mechanisms  - Assist in developing anxiety-reducing skills  - Administer/offer alternative therapies  - Limit or eliminate stimulants  Outcome: Progressing

## 2021-06-22 NOTE — PROGRESS NOTES
Pt participated appropriately during morning vitality group on relaxation  Pt engaged in life skills group focusing on self esteem  Pt disclosed about his anger and how he fears he will be aggressive  Pt expressed feeling proud he reached out for help and reports feeling reluctant to do so in the past  Pt receptive to feedback from group complimenting him for getting help
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
## 2021-06-22 NOTE — ASSESSMENT & PLAN NOTE
· No acute exacerbation  · Patient follows outpatient with pulmonology  · Continue home Breo-ellipta and albuterol inhalers

## 2021-06-22 NOTE — CMS CERTIFICATION NOTE
Certification: Based upon physical, mental and social evaluations, I certify that inpatient psychiatric services are medically necessary for this patient for a duration of 12 midnights for the treatment of major depressive disorder  Available alternative community resources do not meet the patient's mental health care needs  I further attest that an established written individualized plan of care has been implemented and is outlined in the patient's medical records

## 2021-06-22 NOTE — ASSESSMENT & PLAN NOTE
· Patient reports smoking 1-2 packs of cigarettes daily  · Supplement with nicotine patch  · Encouraged cessation

## 2021-06-22 NOTE — CASE MANAGEMENT
06/22/21 0848   Team Meeting   Meeting Type Daily Rounds   Initial Conference Date 06/22/21   Team Members Present   Team Members Present Physician;Nurse;   Physician Team Member Dr Angela Peguerous Team Member [facility] Management Team Member Lizzy   Patient/Family Present   Patient Present No   Patient's Family Present No     Not a 30-day readmission;  201 admission from [facility] for SI, increased depression, anxiety and anger  Recent relationship issues  Stopped taking meds within the last 6 months  Hx of COPD  F/u at [facility]

## 2021-06-23 PROCEDURE — 99232 SBSQ HOSP IP/OBS MODERATE 35: CPT | Performed by: PSYCHIATRY & NEUROLOGY

## 2021-06-23 RX ADMIN — PHENYTOIN 1 MG: 125 SUSPENSION ORAL at 21:09

## 2021-06-23 RX ADMIN — OLANZAPINE 10 MG: 10 TABLET, FILM COATED ORAL at 21:09

## 2021-06-23 RX ADMIN — FLUOXETINE 20 MG: 20 CAPSULE ORAL at 08:44

## 2021-06-23 RX ADMIN — NICOTINE 21 MG: 21 PATCH, EXTENDED RELEASE TRANSDERMAL at 08:48

## 2021-06-23 RX ADMIN — FLUTICASONE FUROATE AND VILANTEROL TRIFENATATE 1 PUFF: 100; 25 POWDER RESPIRATORY (INHALATION) at 08:44

## 2021-06-23 NOTE — TREATMENT PLAN
TREATMENT PLAN REVIEW - [address] 62 y o  1960 male MRN: [id number]    [address] Room / Bed: [address] [address] Encounter: [id number]        Admit Date/Time:  6/21/2021  5:25 PM    Treatment Team: Attending Provider: Heather Gee MD; : Kulwinder Maria; Resident: Marco Antonio Hand MD; Patient Care Assistant: Marysol Arenas; Registered Nurse: Sussy Segal, RN; Therapy Student: Alec Merritt; Care Manager: Cade Jennings, ANGELINE; Occupational Therapy Assistant: Jayce Alanis; Licensed Practical Nurse: Perry Kent LPN; Patient Care Assistant: Ayden Manriquez; Nurse Manager: Leon Perera RN    Diagnosis: Principal Problem:    Unspecified mood (affective) disorder, without psychosis    Active Problems:    PTSD (post-traumatic stress disorder)    Simple chronic bronchitis (HCC)    Lung nodules    Tobacco abuse    Medical clearance for psychiatric admission    Patient Strengths/Assets: capable of independent living, cooperative, communication skills, good physical health, stable housing      Patient Barriers/Limitations: break up with girlfriend, chronic mental illness, noncompliant with medication    Short Term Goals: decrease in depressive symptoms, sleep improvement, improvement in appetite, tolerate medications, mood stabilization, increase in group attendance, increase in group participation, increase in socialization with peers on the unit, acceptance of need for psychiatric treatment, acceptance of psychiatric medications    Long Term Goals: free of suicidal thoughts, acceptance of need for psychiatric medications, acceptance of need for psychiatric treatment, acceptance of need for psychiatric follow up after discharge, acceptance of psychiatric diagnosis, adequate self care, adequate sleep, adequate appetite, appropriate interaction with peers, appropriate interaction with family    Progress Towards Goals: starting psychiatric medications as prescribed, progressing, attends groups more often, participates more in milieu therapy, mood is stabilizing gradually, no longer suicidal    Recommended Treatment: medication management, patient medication education, group therapy, milieu therapy, continued Behavioral Health psychiatric evaluation/assessment process     Treatment Frequency: daily medication monitoring, group and milieu therapy daily, monitoring through interdisciplinary rounds, monitoring through weekly patient care conferences    Expected Discharge Date: 7 days - 6/30/2021    Discharge Plan: referral for outpatient medication management with a psychiatrist, referral for outpatient psychotherapy, return to previous living arrangement    Treatment Plan Created/Updated By: Danielle Jesus MD
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
## 2021-06-23 NOTE — PLAN OF CARE
Problem: Risk for Self Injury/Neglect  Goal: Treatment Goal: Remain safe during length of stay, learn and adopt new coping skills, and be free of self-injurious ideation, impulses and acts at the time of discharge  Outcome: Progressing     Problem: Depression  Goal: Treatment Goal: Demonstrate behavioral control of depressive symptoms, verbalize feelings of improved mood/affect, and adopt new coping skills prior to discharge  Outcome: Progressing  Goal: Verbalize thoughts and feelings  Description: Interventions:  - Assess and re-assess patient's level of risk   - Engage patient in 1:1 interactions, daily, for a minimum of 15 minutes   - Encourage patient to express feelings, fears, frustrations, hopes   Outcome: Progressing  Goal: Refrain from harming self  Description: Interventions:  - Monitor patient closely, per order   - Supervise medication ingestion, monitor effects and side effects   Outcome: Progressing  Goal: Refrain from isolation  Description: Interventions:  - Develop a trusting relationship   - Encourage socialization   Outcome: Progressing  Goal: Refrain from self-neglect  Outcome: Progressing  Goal: Attend and participate in unit activities, including therapeutic, recreational, and educational groups  Description: Interventions:  - Provide therapeutic and educational activities daily, encourage attendance and participation, and document same in the medical record   Outcome: Progressing  Goal: Complete daily ADLs, including personal hygiene independently, as able  Description: Interventions:  - Observe, teach, and assist patient with ADLS  -  Monitor and promote a balance of rest/activity, with adequate nutrition and elimination   Outcome: Progressing     Problem: Anxiety  Goal: Anxiety is at manageable level  Description: Interventions:  - Assess and monitor patient's anxiety level     - Monitor for signs and symptoms (heart palpitations, chest pain, shortness of breath, headaches, nausea, feeling jumpy, restlessness, irritable, apprehensive)  - Collaborate with interdisciplinary team and initiate plan and interventions as ordered    - [place] patient to unit/surroundings  - Explain treatment plan  - Encourage participation in care  - Encourage verbalization of concerns/fears  - Identify coping mechanisms  - Assist in developing anxiety-reducing skills  - Administer/offer alternative therapies  - Limit or eliminate stimulants  Outcome: Progressing

## 2021-06-23 NOTE — PROGRESS NOTES
06/23/21 1352   Team Meeting   Meeting Type Tx Team Meeting   Initial Conference Date 06/23/21   Team Members Present   Team Members Present Physician;Nurse;   Physician Team Member Dr Rajeev Ventura Team Member [facility] Management Team Member Lizzy   Patient/Family Present   Patient Present Yes   Patient's Family Present No     Treatment goals include: decreasing depression/anxiety, building effective coping skills, remaining safe on unit, and discharge planning

## 2021-06-23 NOTE — CASE MANAGEMENT
06/23/21 0848   Team Meeting   Meeting Type Daily Rounds   Initial Conference Date 06/23/21   Team Members Present   Team Members Present Physician;Nurse;;; Occupational Therapist   Physician Team Member Dr Thompson Peers; Dr Christina Schwartz; [address]  Team Member [facility] Management Team Member Yudi Velarde   Social Work Team Member Karin Aguilar   OT Team Member Brendan Rodriguez   Patient/Family Present   Patient Present No   Patient's Family Present No     5/10 depression, denies anxiety, slept, med compliant, visible, attends groups, calm, cooperative, pleasant Scalpel Size: 15 blade

## 2021-06-23 NOTE — CASE MANAGEMENT
Spoke with pt's friend, Sun Chakraborty [phone], to obtain collateral info  Sun Chakraborty has known pt for a couple of years and they talk on every day but don't see one another too often due to distance  She lives about 2 hours away  She's noticed a change in pt's mood/demeanor over the last few weeks and is glad that pt came to hospital to get some help  Sun Chakraborty reports that pt has been depressed and paranoid about his good friend, Caity Roque, "messing around with the girl he was seeing " Caity Roque lives in the same building as pt and helps pt with transportation, shopping, etc  Sun Chakraborty reports that at one point, pt was thinking about rooming with Caity Roque in order to share costs and have some company but due to the recent paranoia, Sun Chakraborty doesn't think that pt will pursue this  Sun Chakraborty reports that the place that pt lives is "depressing" and she hopes that he can find a new place that's closer to [employer]  She reports that one of pt's barriers is transportation  He doesn't drive because driving makes him very anxious  She reports that pt was shaking in her car the one time when she drove him to the city  Sun Chakraborty tries to be a very positive presence in pt's life but he lives in the past too much and constantly rehashes things that have happened in the past  Sun Chakraborty also thinks that he struggles with the fact that he needs to depend on others for things such as transportation

## 2021-06-23 NOTE — PROGRESS NOTES
Progress Note - [address] 62 y o  male MRN: [id number]  Unit/Bed#: Mechelle Fischer 299-41 Encounter: [id number]    Assessment/Plan   Principal Problem:    Unspecified mood (affective) disorder, without psychosis  Active Problems:    PTSD (post-traumatic stress disorder)    Simple chronic bronchitis (HCC)    Lung nodules    Tobacco abuse    Medical clearance for psychiatric admission    Recommended Treatment:   · Continue Zyprexa 10 mg and Prozac 20 mg for mood stabilization  · Continue Prazosin 1 mg QHS for nightmares and PTSD  · Start individual therapy sessions  · Continue with group therapy, milieu therapy and occupational therapy  · Risks, benefits and possible side effects of Medications: Risks, benefits, and possible side effects of medications have been explained to the patient, who verbalizes understanding  Progress Toward Goals: slow improvement    ------------------------------------------------------------    Subjective: Genevieve Quijano has been compliant with medications without acute side effects  Per nursing, he has been pleasant, cooperative and visible on the unit  Genevieve Quijano states that he has been doing okay since his admission  He had a few nocturnal flashbacks last night that disrupted his sleep  He has been trying to keep his mind busy with group therapy, books and puzzles, but is finding it difficult to relate to some of his lower-functioning peers  He recently called his friends on the phone and confronted the friend who he thought was sleeping with his girlfriend; though his friend denied the allegations, Genevieve Quijano remains paranoid about this  We counseled Genevieve Quijano about PTSD-related hypervigilance and how his past trauma may be a barrier to maintaining trusting relationships, and he was receptive  Genevieve Quijano denies any current suicidal ideations, intent or plan  Genevieve Quijano is consenting for safety on the unit and denies suicidal ideations at this time      Psychiatric Review of Systems:  Behavior over the last 24 hours: improved  Sleep: normal with some nocturnal flashbacks  Appetite: good  Medication side effects: none verbalized  ROS: Complete review of systems is negative except as noted above      Vital signs in last 24 hours:  Temp:  [97 5 °F (36 4 °C)-97 8 °F (36 6 °C)] 97 8 °F (36 6 °C)  HR:  [68-90] 68  Resp:  [18-19] 19  BP: (116-124)/(59-75) 116/59    Mental Status Evaluation:  Appearance:  alert, good eye contact and appropriate grooming and hygiene   Behavior:  calm and cooperative   Speech:  spontaneous and coherent   Mood:  depressed   Affect:  mood-congruent and constricted   Thought Process:  organized, goal directed   Thought Content: no verbalized delusions or overt paranoia   Perceptual disturbances: no reported hallucinations and does not appear to be responding to internal stimuli at this time   Risk Potential: No active or passive suicidal or homicidal ideation was verbalized during interview   Cognition: oriented to self and situation, appears to be of average intelligence and cognition not formally tested   Insight:  Limited   Judgment: Fair       Current Medications:  Current Facility-Administered Medications   Medication Dose Route Frequency Provider Last Rate    acetaminophen  650 mg Oral Q4H PRN Minor Bouquet, CRNP      acetaminophen  650 mg Oral Q4H PRN Minor Bouquet, CRNP      acetaminophen  975 mg Oral Q6H PRN Minor Bouquet, CRNP      albuterol  2 puff Inhalation Q6H PRN Christen Mesa PA-C      aluminum-magnesium hydroxide-simethicone  30 mL Oral Q4H PRN Minor Bouquet, CRNP      benztropine  1 mg Intramuscular Q4H PRN Max 6/day Minor Bouquet, CRNP      benztropine  0 5 mg Oral Q4H PRN Max 6/day Minor Bouquet, CRNP      FLUoxetine  20 mg Oral Daily Marla Cobb MD      fluticasone-vilanterol  1 puff Inhalation Daily Christen Mesa PA-C      LORazepam  1 mg Intramuscular Q6H PRN Max 3/day Minor Bouquet, CRNP      LORazepam  0 5 mg Oral Q6H PRN Max 4/day Willye Parents, CRNP      LORazepam  1 mg Oral Q6H PRN Max 3/day Willye Parents, CRNP      mirtazapine  7 5 mg Oral HS PRN Willye Parents, CRNP      nicotine  21 mg Transdermal Daily Christen Mesa PA-C      nicotine polacrilex  2 mg Oral Q2H PRN Willye Parents, CRNP      OLANZapine  5 mg Intramuscular Q3H PRN Max 3/day Willye Parents, CRNP      OLANZapine  10 mg Oral HS Naz Tran MD      OLANZapine  5 mg Oral Q3H PRN Max 3/day Willye Parents, CRNP      prazosin  1 mg Oral HS Naz Tran MD      senna-docusate sodium  1 tablet Oral Daily PRN Willye Parents, CRNP         Behavioral Health Medications: all current active meds have been reviewed  Changes as above  Laboratory results:  I have personally reviewed all pertinent laboratory/tests results  No results found for this or any previous visit (from the past 48 hour(s))  This note has been constructed using a voice recognition system  There may be translation, syntax, or grammatical errors  If you have any questions, please contact the dictating provider

## 2021-06-23 NOTE — PLAN OF CARE
Pt attended two of three groups in morning only  Refused afternoon session of guided imagery stating that it had not worked for him in the past   Problem: Ineffective Coping  Goal: Participates in unit activities  Description: Interventions:  - Provide therapeutic environment   - Provide required programming   - Redirect inappropriate behaviors   Outcome: Progressing

## 2021-06-23 NOTE — PROGRESS NOTES
Reviewed chart notes for update and progress    This writer will conduct an individual session today 06-

## 2021-06-23 NOTE — NURSING NOTE
Pt calm and cooperative, visible on unit  Social with peers  Utilizes humor at times  Pt denies SI, contracts for safety  Medication compliant  Pt appears to sleep

## 2021-06-23 NOTE — PROGRESS NOTES
06/23/21 1100   Activity/Group Checklist   Group Other (Comment)  (short term problem solving)   Attendance Attended   Attendance Duration (min) 31-45   Interactions Other (Comment)  (expressed discomfort with pain)   Affect/Mood Constricted   Goals Achieved Identified feelings; Identified relapse prevention strategies; Discussed coping strategies; Discussed self-esteem issues; Able to listen to others; Able to engage in interactions

## 2021-06-23 NOTE — PROGRESS NOTES
Pt participated appropriately during morning community meeting  Pt refused afternoon relaxation group on guided imagery  Staff extended invitation to join group at any point, Pt refused to do so

## 2021-06-23 NOTE — CASE MANAGEMENT
Pt agreeable to CM completing referral for [facility]  Forms reviewed and signed   CM will obtain MD letter and submit referral

## 2021-06-24 PROCEDURE — 99232 SBSQ HOSP IP/OBS MODERATE 35: CPT | Performed by: PSYCHIATRY & NEUROLOGY

## 2021-06-24 RX ADMIN — FLUOXETINE 20 MG: 20 CAPSULE ORAL at 08:07

## 2021-06-24 RX ADMIN — OLANZAPINE 10 MG: 10 TABLET, FILM COATED ORAL at 21:05

## 2021-06-24 RX ADMIN — PHENYTOIN 1 MG: 125 SUSPENSION ORAL at 21:05

## 2021-06-24 RX ADMIN — NICOTINE 21 MG: 21 PATCH, EXTENDED RELEASE TRANSDERMAL at 08:11

## 2021-06-24 RX ADMIN — FLUTICASONE FUROATE AND VILANTEROL TRIFENATATE 1 PUFF: 100; 25 POWDER RESPIRATORY (INHALATION) at 08:07

## 2021-06-24 NOTE — NURSING NOTE
Pt resting in room, visible on unit in evening  Social with peers, good intake at meal  Pt reports feeling much calmer today, tolerating medications well  No symptoms reported  Able to discuss symptoms he was experiencing at home prior to admission, good insight

## 2021-06-24 NOTE — PROGRESS NOTES
Pt completed relapse prevention plan  Pt able to identify signs/symptoms and positive coping skills with no difficulty  Pt focused to task

## 2021-06-24 NOTE — NURSING NOTE
Patient calm and cooperative on approach, denies s/s  Patient remains in his room out for  Meals or when prompted with minimal interactions with peers  Patient compliant with mediations and meals the shift

## 2021-06-24 NOTE — CASE MANAGEMENT
DEVYN obtained for Vickie Morrow     Spoke with Mac Pulliam [phone] to give update  She reports that she's been working with pt for awhile trying to find a new place for him to live but it's been challenging due to the limited options that are in pt's price range  She spoke with pt about 1 week ago and he opened up to her about his recent breakup and other stressors  She's glad that he sought out help  She will follow up with pt after d/c to resume housing search

## 2021-06-24 NOTE — CASE MANAGEMENT
Left  for [facility] [phone] to schedule OP appts    Renetta Lamb transportation application completed and submitted

## 2021-06-24 NOTE — CASE MANAGEMENT
06/24/21 0907   Team Meeting   Meeting Type Daily Rounds   Initial Conference Date 06/24/21   Team Members Present   Team Members Present Physician;Nurse;;; Occupational Therapist   Physician Team Member Dr Haleigh Weathers; [address]  Team Member [facility] Management Team Member Christian Mojica   Social Work Team Member Loree   OT Team Member Lila Arce   Patient/Family Present   Patient Present No   Patient's Family Present No     Visible, feels calmer, slept, quiet, med compliant, had 1:1 session with therapist yesterday, attends some groups, social

## 2021-06-24 NOTE — PROGRESS NOTES
Pt attended reminiscence and music group  Pt initially constricted affect and negative thinking pattens (how can we have a group with only a few people   )  After pt engaged within group and more people arrived, pt was able to focus on mh recovery needs and displayed awareness of d/c planning needs  Pt stated he had PTSD and anxiety  Pt was able to share strengths, likes and past memories in school  Pt enjoys History and the [employer]  Pt shared his bucket list and expressed interest in other peers responses, able to take turns and polite  Pt able to demonstrate recall with musical exercise  06/24/21 1330   Activity/Group Checklist   Group Other (Comment)  (reminiscence and music)   Attendance Attended   Attendance Duration (min) 31-45   Interactions Interacted appropriately   Affect/Mood Constricted   Goals Achieved Identified feelings; Discussed coping strategies; Able to listen to others; Able to engage in interactions

## 2021-06-24 NOTE — PROGRESS NOTES
Progress Note - [address] 62 y o  male MRN: [id number]  Unit/Bed#: Elvi Wagoner 132-74 Encounter: [id number]    The patient was seen for continuing care and reviewed with treatment team   Staff notes patient has been visible in the milieu but somewhat quiet and scant  He has been appearing calmer and is appropriate in the structured group setting  He is still feeling anxious and depressed but says he feels much more relaxed and less tense  He really enjoyed the individual therapy session and is hopeful that he can have more of those  He says he has been mentally and physically drained from everything that happened to him in his life and the more he can get off his chest the better he feels  States he is sleeping better than usual and his nightmares are not as bad  His appetite is improving  He feels grateful that his friends in his apartment building wanted him to go to the hospital to get help  He denies suicidal intent or plan but is still having passive death wishes and thoughts of why am I still here  Denies medication side effects with the exception of my left leg is restless at night"           Unspecified mood (affective) disorder (HCC)    Vital signs in last 24 hours:  Temp:  [97 4 °F (36 3 °C)-98 2 °F (36 8 °C)] 97 4 °F (36 3 °C)  HR:  [77-89] 80  Resp:  [16] 16  BP: (115-132)/(58-80) 115/58    Mental Status Evaluation:    Appearance Adequate hygiene and grooming   Behavior calm and cooperative   Mood anxious, depressed and improving   Speech Normal rate and volume   Affect appropriate   Thought Processes Goal directed and coherent   Thought Content Does not verbalize delusional material   Perceptual Disturbances Denies hallucinations and does not appear to be responding to internal stimuli   Risk Potential Suicidal/Homicidal Ideation - passive death wishes  Risk of Violence - No evidence of risk for violence found on assessment  Risk of Self Mutilation - No evidence of risk for self mutilation found on assessment   Sensorium oriented to person, place, time/date and situation   Cognition/Memory recent and remote memory grossly intact   Consciousness alert and awake   Attention/Concentration attention span and concentration appear shorter than expected for age   Insight limited   Judgement limited   Muscle Strength and Gait/Station normal muscle strength and normal muscle tone, normal gait/station and normal balance   Motor Activity no abnormal movements        Progress Toward Goals:  Progressing      Recommended Treatment: Continue with pharmacotherapy, group therapy, milieu therapy and occupational therapy    The patient will be maintained on the following medications:  Current Facility-Administered Medications   Medication Dose Route Frequency Provider Last Rate    acetaminophen  650 mg Oral Q4H PRN Leida Independence, CRNP      acetaminophen  650 mg Oral Q4H PRN Leida Independence, CRNP      acetaminophen  975 mg Oral Q6H PRN Leida Independence, CRNP      albuterol  2 puff Inhalation Q6H PRN Christen Mesa PA-C      aluminum-magnesium hydroxide-simethicone  30 mL Oral Q4H PRN Leida Independence, CRNP      benztropine  1 mg Intramuscular Q4H PRN Max 6/day Leida Independence, CRNP      benztropine  0 5 mg Oral Q4H PRN Max 6/day Leida Independence, CRNP      FLUoxetine  20 mg Oral Daily Tita Lewis MD      fluticasone-vilanterol  1 puff Inhalation Daily Christen Mesa PA-C      LORazepam  1 mg Intramuscular Q6H PRN Max 3/day Leida Independence, CRNP      LORazepam  0 5 mg Oral Q6H PRN Max 4/day Leida Independence, CRNP      LORazepam  1 mg Oral Q6H PRN Max 3/day Leida Independence, CRNP      mirtazapine  7 5 mg Oral HS PRN Leida Independence, CRNP      nicotine  21 mg Transdermal Daily Christen Mesa PA-C      nicotine polacrilex  2 mg Oral Q2H PRN Leida Independence, CRNP      OLANZapine  5 mg Intramuscular Q3H PRN Max 3/day Leida Independence, CRNP      OLANZapine  10 mg Oral HS Tita Lewis MD     [facility] OLANZapine  5 mg Oral Q3H PRN Max 3/day Baldemar Gibbs, CRNP      prazosin  1 mg Oral HS Radha Schaefer MD      senna-docusate sodium  1 tablet Oral Daily PRN Baldemar Gibbs, CRNP         Unspecified mood (affective) disorder ([facility] )

## 2021-06-25 PROCEDURE — 99232 SBSQ HOSP IP/OBS MODERATE 35: CPT | Performed by: NURSE PRACTITIONER

## 2021-06-25 RX ADMIN — PHENYTOIN 1 MG: 125 SUSPENSION ORAL at 21:02

## 2021-06-25 RX ADMIN — OLANZAPINE 10 MG: 10 TABLET, FILM COATED ORAL at 21:02

## 2021-06-25 RX ADMIN — FLUOXETINE 20 MG: 20 CAPSULE ORAL at 08:32

## 2021-06-25 RX ADMIN — NICOTINE 21 MG: 21 PATCH, EXTENDED RELEASE TRANSDERMAL at 08:33

## 2021-06-25 RX ADMIN — FLUTICASONE FUROATE AND VILANTEROL TRIFENATATE 1 PUFF: 100; 25 POWDER RESPIRATORY (INHALATION) at 08:32

## 2021-06-25 NOTE — PLAN OF CARE
Pt  Did not attend any of three groups today    Problem: Ineffective Coping  Goal: Participates in unit activities  Description: Interventions:  - Provide therapeutic environment   - Provide required programming   - Redirect inappropriate behaviors   Outcome: Not Progressing

## 2021-06-25 NOTE — PLAN OF CARE
Problem: Risk for Self Injury/Neglect  Goal: Treatment Goal: Remain safe during length of stay, learn and adopt new coping skills, and be free of self-injurious ideation, impulses and acts at the time of discharge  Outcome: Progressing     Problem: Depression  Goal: Treatment Goal: Demonstrate behavioral control of depressive symptoms, verbalize feelings of improved mood/affect, and adopt new coping skills prior to discharge  Outcome: Progressing  Goal: Verbalize thoughts and feelings  Description: Interventions:  - Assess and re-assess patient's level of risk   - Engage patient in 1:1 interactions, daily, for a minimum of 15 minutes   - Encourage patient to express feelings, fears, frustrations, hopes   Outcome: Progressing  Goal: Refrain from harming self  Description: Interventions:  - Monitor patient closely, per order   - Supervise medication ingestion, monitor effects and side effects   Outcome: Progressing  Goal: Refrain from isolation  Description: Interventions:  - Develop a trusting relationship   - Encourage socialization   Outcome: Progressing  Goal: Refrain from self-neglect  Outcome: Progressing  Goal: Attend and participate in unit activities, including therapeutic, recreational, and educational groups  Description: Interventions:  - Provide therapeutic and educational activities daily, encourage attendance and participation, and document same in the medical record   Outcome: Progressing  Goal: Complete daily ADLs, including personal hygiene independently, as able  Description: Interventions:  - Observe, teach, and assist patient with ADLS  -  Monitor and promote a balance of rest/activity, with adequate nutrition and elimination   Outcome: Progressing     Problem: Anxiety  Goal: Anxiety is at manageable level  Description: Interventions:  - Assess and monitor patient's anxiety level     - Monitor for signs and symptoms (heart palpitations, chest pain, shortness of breath, headaches, nausea, feeling jumpy, restlessness, irritable, apprehensive)  - Collaborate with interdisciplinary team and initiate plan and interventions as ordered    - [place] patient to unit/surroundings  - Explain treatment plan  - Encourage participation in care  - Encourage verbalization of concerns/fears  - Identify coping mechanisms  - Assist in developing anxiety-reducing skills  - Administer/offer alternative therapies  - Limit or eliminate stimulants  Outcome: Progressing

## 2021-06-25 NOTE — CASE MANAGEMENT
06/25/21 0844   Team Meeting   Meeting Type Daily Rounds   Initial Conference Date 06/25/21   Team Members Present   Team Members Present Physician;Nurse;;   Physician Team Member Dr Kristy Sebastian; [address]   Nursing Team Member [facility] Management Team Member Lorrie Cisneros   Social Work Team Member Solis Mazariegos   OT Team Member Tatiana Ortez   Patient/Family Present   Patient Present No   Patient's Family Present No     Calm, cooperative, denies SI, med compliant, slept, visible, pleasant, attends groups, frustrated that he doesn't have other peers to speak with on the unit ---

## 2021-06-25 NOTE — NURSING NOTE
Pt calm and cooperative on unit  Expresses frustration due to not having peers to talk to on unit   Pt tolerating medications well, reports calm mood and denies SI

## 2021-06-25 NOTE — PROGRESS NOTES
Progress Note - [address] 62 y o  male MRN: [id number]  Unit/Bed#: Siria Herr 864-43 Encounter: [id number]    The patient was seen for continuing care and reviewed with treatment team   Staff notes patient has been somewhat withdrawn stating there is no one here for him to talk to his own age  He has been attending and interacting appropriately in groups  He remains anxious and depressed but says he is starting to feel a little bit "more stable"  He has been fatigued today and has been spending most of the morning in his room napping  He was up last night with weird dreams but no nightmares  Appetite has been good  Denies suicidal thoughts      Unspecified mood (affective) disorder ([facility])    Vital signs in last 24 hours:  Temp:  [97 5 °F (36 4 °C)-97 6 °F (36 4 °C)] 97 5 °F (36 4 °C)  HR:  [73-75] 75  Resp:  [16] 16  BP: (127-129)/(68-80) 127/68    Mental Status Evaluation:    Appearance Adequate hygiene and grooming   Behavior calm and cooperative   Mood anxious, depressed and improving   Speech Normal rate and volume   Affect appropriate   Thought Processes Goal directed and coherent   Thought Content Does not verbalize delusional material   Perceptual Disturbances Denies hallucinations and does not appear to be responding to internal stimuli   Risk Potential Suicidal/Homicidal Ideation - No evidence of suicidal or homicidal ideation and patient does not verbalize suicidal or homicidal ideation  Risk of Violence - No evidence of risk for violence found on assessment  Risk of Self Mutilation - No evidence of risk for self mutilation found on assessment   Sensorium oriented to person, place and time/date   Cognition/Memory recent and remote memory grossly intact   Consciousness alert and awake   Attention/Concentration attention span and concentration appear shorter than expected for age   Insight limited   Judgement limited   Muscle Strength and Gait/Station normal muscle strength and normal muscle tone, normal gait/station and normal balance   Motor Activity no abnormal movements       Progress Toward Goals:  Progressing      Recommended Treatment: Continue with pharmacotherapy, group therapy, milieu therapy and occupational therapy    The patient will be maintained on the following medications:  Current Facility-Administered Medications   Medication Dose Route Frequency Provider Last Rate    acetaminophen  650 mg Oral Q4H PRN Henreitta Danas, CRNP      acetaminophen  650 mg Oral Q4H PRN Henreitta Danas, CRNP      acetaminophen  975 mg Oral Q6H PRN Geovannia Danas, CRNP      albuterol  2 puff Inhalation Q6H PRN Christen Mesa PA-C      aluminum-magnesium hydroxide-simethicone  30 mL Oral Q4H PRN Umangreroberta Danas, CRNP      benztropine  1 mg Intramuscular Q4H PRN Max 6/day Henreitta Danas, CRNP      benztropine  0 5 mg Oral Q4H PRN Max 6/day Henreitta Danas, CRNP      FLUoxetine  20 mg Oral Daily Lara Johnson MD      fluticasone-vilanterol  1 puff Inhalation Daily Christen Mesa PA-C      LORazepam  1 mg Intramuscular Q6H PRN Max 3/day Henreitta Danas, CRNP      LORazepam  0 5 mg Oral Q6H PRN Max 4/day Umangreitta Danas, CRNP      LORazepam  1 mg Oral Q6H PRN Max 3/day Henreitta Danas, CRNP      mirtazapine  7 5 mg Oral HS PRN Umangreitta Danas, CRNP      nicotine  21 mg Transdermal Daily Christen Mesa PA-C      nicotine polacrilex  2 mg Oral Q2H PRN Umangreroberta Danas, CRNP      OLANZapine  5 mg Intramuscular Q3H PRN Max 3/day Henreitta Danas, CRNP      OLANZapine  10 mg Oral HS Lara Johnson MD      OLANZapine  5 mg Oral Q3H PRN Max 3/day Umangreitta Danas, CRNP      prazosin  1 mg Oral HS Lara Johnson MD      senna-docusate sodium  1 tablet Oral Daily PRN Umangreprabha Danas, CRNP         Unspec[facility]affective) [facility] ([facility] )

## 2021-06-26 PROCEDURE — 99232 SBSQ HOSP IP/OBS MODERATE 35: CPT | Performed by: PHYSICIAN ASSISTANT

## 2021-06-26 RX ADMIN — PHENYTOIN 1 MG: 125 SUSPENSION ORAL at 21:02

## 2021-06-26 RX ADMIN — ACETAMINOPHEN 975 MG: 325 TABLET ORAL at 08:01

## 2021-06-26 RX ADMIN — FLUOXETINE 20 MG: 20 CAPSULE ORAL at 08:01

## 2021-06-26 RX ADMIN — FLUTICASONE FUROATE AND VILANTEROL TRIFENATATE 1 PUFF: 100; 25 POWDER RESPIRATORY (INHALATION) at 08:01

## 2021-06-26 RX ADMIN — NICOTINE 21 MG: 21 PATCH, EXTENDED RELEASE TRANSDERMAL at 08:00

## 2021-06-26 RX ADMIN — OLANZAPINE 10 MG: 10 TABLET, FILM COATED ORAL at 21:02

## 2021-06-26 NOTE — PLAN OF CARE
Problem: Risk for Self Injury/Neglect  Goal: Treatment Goal: Remain safe during length of stay, learn and adopt new coping skills, and be free of self-injurious ideation, impulses and acts at the time of discharge  Outcome: Progressing     Problem: Depression  Goal: Treatment Goal: Demonstrate behavioral control of depressive symptoms, verbalize feelings of improved mood/affect, and adopt new coping skills prior to discharge  Outcome: Progressing  Goal: Verbalize thoughts and feelings  Description: Interventions:  - Assess and re-assess patient's level of risk   - Engage patient in 1:1 interactions, daily, for a minimum of 15 minutes   - Encourage patient to express feelings, fears, frustrations, hopes   Outcome: Progressing  Goal: Refrain from harming self  Description: Interventions:  - Monitor patient closely, per order   - Supervise medication ingestion, monitor effects and side effects   Outcome: Progressing  Goal: Refrain from isolation  Description: Interventions:  - Develop a trusting relationship   - Encourage socialization   Outcome: Progressing  Goal: Refrain from self-neglect  Outcome: Progressing  Goal: Attend and participate in unit activities, including therapeutic, recreational, and educational groups  Description: Interventions:  - Provide therapeutic and educational activities daily, encourage attendance and participation, and document same in the medical record   Outcome: Progressing  Goal: Complete daily ADLs, including personal hygiene independently, as able  Description: Interventions:  - Observe, teach, and assist patient with ADLS  -  Monitor and promote a balance of rest/activity, with adequate nutrition and elimination   Outcome: Progressing     Problem: Anxiety  Goal: Anxiety is at manageable level  Description: Interventions:  - Assess and monitor patient's anxiety level     - Monitor for signs and symptoms (heart palpitations, chest pain, shortness of breath, headaches, nausea, feeling jumpy, restlessness, irritable, apprehensive)  - Collaborate with interdisciplinary team and initiate plan and interventions as ordered    - [employer] patient to unit/surroundings  - Explain treatment plan  - Encourage participation in care  - Encourage verbalization of concerns/fears  - Identify coping mechanisms  - Assist in developing anxiety-reducing skills  - Administer/offer alternative therapies  - Limit or eliminate stimulants  Outcome: Progressing     Problem: Ineffective Coping  Goal: Participates in unit activities  Description: Interventions:  - Provide therapeutic environment   - Provide required programming   - Redirect inappropriate behaviors   Outcome: Progressing

## 2021-06-26 NOTE — NURSING NOTE
Pt is calm and cooperative  Pt reports 3/4 anxiety, with minimal depression  Pt is social, and pleasant with staff and peers  Pt has complaints of shoulder pain, relieved with Tylenol  Pt offers no other complaints, is medication compliant

## 2021-06-26 NOTE — PROGRESS NOTES
Progress Note - [address] 62 y o  male MRN: [id number]  Unit/Bed#: [address] 391-18 Encounter: [id number]    Assessment/Plan   Principal Problem:    Unspecified mood (affective) disorder, without psychosis  Active Problems:    PTSD (post-traumatic stress disorder)    Simple chronic bronchitis (HCC)    Lung nodules    Tobacco abuse    Medical clearance for psychiatric admission      Subjective: Patient was seen, chart reviewed and case discussed with team   Patient cooperative and pleasant  Seen reading a book in his room  States that his depression and anxiety are decreasing by the day  States that his suicidal thoughts are diminishing  Does report that his sleep is impaired due to having nightmares and vivid dreams  Advised patient to take off nicotine patch tonight  If patient continues to have bizarre dreams can consider increasing Prazosin  States that his appetite and energy levels are improving  Reports that anxiety is decreasing  No signs of lorenzo or agitation  Seen out attending groups and social with his peers  Denied psychosis and delusional material   Medication compliant  Appears to be tolerating medications well without serious side effects      Psychiatric Review of Systems:  Behavior over the last 24 hours:  improved  Sleep: poor  Appetite: normal  Medication side effects: No  ROS: no complaints, all others negative    Current Medications:  Current Facility-Administered Medications   Medication Dose Route Frequency    acetaminophen (TYLENOL) tablet 650 mg  650 mg Oral Q4H PRN    acetaminophen (TYLENOL) tablet 650 mg  650 mg Oral Q4H PRN    acetaminophen (TYLENOL) tablet 975 mg  975 mg Oral Q6H PRN    albuterol (PROVENTIL HFA,VENTOLIN HFA) inhaler 2 puff  2 puff Inhalation Q6H PRN    aluminum-magnesium hydroxide-simethicone (MYLANTA) oral suspension 30 mL  30 mL Oral Q4H PRN    benztropine (COGENTIN) injection 1 mg  1 mg Intramuscular Q4H PRN Max 6/day    benztropine (COGENTIN) tablet 0 5 mg  0 5 mg Oral Q4H PRN Max 6/day    FLUoxetine (PROzac) capsule 20 mg  20 mg Oral Daily    fluticasone-vilanterol (BREO ELLIPTA) 100-25 mcg/inh inhaler 1 puff  1 puff Inhalation Daily    LORazepam (ATIVAN) injection 1 mg  1 mg Intramuscular Q6H PRN Max 3/day    LORazepam (ATIVAN) tablet 0 5 mg  0 5 mg Oral Q6H PRN Max 4/day    LORazepam (ATIVAN) tablet 1 mg  1 mg Oral Q6H PRN Max 3/day    mirtazapine (REMERON) tablet 7 5 mg  7 5 mg Oral HS PRN    nicotine (NICODERM CQ) 21 mg/24 hr TD 24 hr patch 21 mg  21 mg Transdermal Daily    nicotine polacrilex (NICORETTE) gum 2 mg  2 mg Oral Q2H PRN    OLANZapine (ZyPREXA) IM injection 5 mg  5 mg Intramuscular Q3H PRN Max 3/day    OLANZapine (ZyPREXA) tablet 10 mg  10 mg Oral HS    OLANZapine (ZyPREXA) tablet 5 mg  5 mg Oral Q3H PRN Max 3/day    prazosin (MINIPRESS) capsule 1 mg  1 mg Oral HS    senna-docusate sodium (SENOKOT S) 8 6-50 mg per tablet 1 tablet  1 tablet Oral Daily PRN       Behavioral Health Medications: all current active meds have been reviewed and continue current psychiatric medications  Vitals:  Vitals:    06/26/21 0653   BP: 111/57   Pulse: 70   Resp: 18   Temp: 98 5 °F (36 9 °C)   SpO2: 93%       Laboratory results:    I have personally reviewed all pertinent laboratory/tests results    Most Recent Labs:   Lab Results   Component Value Date    WBC 11 19 (H) 06/20/2021    RBC 5 17 06/20/2021    HGB 16 0 06/20/2021    HCT 46 5 06/20/2021     06/20/2021    RDW 12 8 06/20/2021    NEUTROABS 8 37 (H) 06/20/2021    SODIUM 136 06/20/2021    K 3 5 06/20/2021     06/20/2021    CO2 22 06/20/2021    BUN 8 06/20/2021    CREATININE 0 95 06/20/2021    GLUC 87 06/20/2021    GLUF 109 (H) 03/19/2019    CALCIUM 8 7 06/20/2021    AST 18 06/20/2021    ALT 23 06/20/2021    ALKPHOS 91 06/20/2021    TP 7 3 06/20/2021    ALB 4 1 06/20/2021    TBILI 0 54 06/20/2021    CHOLESTEROL 168 03/19/2019    HDL 43 03/19/2019 TRIG 137 03/19/2019    LDLCALC 98 03/19/2019    [id number] 2 459 06/20/2021    HGBA1C 4 9 04/11/2019    EAG 94 04/11/2019       Mental Status Evaluation:  Appearance:  casually dressed   Behavior:  Cooperative and pleasant   Speech:  normal pitch and normal volume   Mood:  Less depressed and anxious   Affect:  mood-congruent   Language Appropriate   Thought Process:  normal   Thought Content:  normal   Perceptual Disturbances: None   Risk Potential: Decreased suicidal ideation  Denied HI  Potential for aggression no   Sensorium:  person, place and time/date   Cognition:  recent and remote memory grossly intact   Consciousness:  alert and awake    Attention: attention span appeared shorter than expected for age   Insight:  Partial   Judgment: Improving   Gait/Station: normal gait/station and normal balance   Motor Activity: no abnormal movements     Progress Toward Goals:  Progressing    Recommended Treatment: Continue with pharmacotherapy, group therapy, milieu therapy and occupational therapy  1  Continue current medications  2  Advised patient to take off nicotine patch tonight      Risks, benefits and possible side effects of Medications:   Risks, benefits, and possible side effects of medications explained to patient and patient verbalizes understanding

## 2021-06-26 NOTE — NURSING NOTE
Pt calm, cooperative, pleasant on approach  Rates his depression 6/10, denies SI  Compliant with hs medications and slept all night

## 2021-06-27 PROCEDURE — 99232 SBSQ HOSP IP/OBS MODERATE 35: CPT | Performed by: PHYSICIAN ASSISTANT

## 2021-06-27 RX ADMIN — FLUTICASONE FUROATE AND VILANTEROL TRIFENATATE 1 PUFF: 100; 25 POWDER RESPIRATORY (INHALATION) at 08:10

## 2021-06-27 RX ADMIN — FLUOXETINE 20 MG: 20 CAPSULE ORAL at 08:09

## 2021-06-27 RX ADMIN — ACETAMINOPHEN 650 MG: 325 TABLET ORAL at 08:09

## 2021-06-27 RX ADMIN — LORAZEPAM 0.5 MG: 0.5 TABLET ORAL at 12:44

## 2021-06-27 RX ADMIN — OLANZAPINE 10 MG: 10 TABLET, FILM COATED ORAL at 21:00

## 2021-06-27 RX ADMIN — PHENYTOIN 1 MG: 125 SUSPENSION ORAL at 21:00

## 2021-06-27 RX ADMIN — NICOTINE 21 MG: 21 PATCH, EXTENDED RELEASE TRANSDERMAL at 08:09

## 2021-06-27 NOTE — NURSING NOTE
Pt calm, cooperative  He reports 5/10 depression and 2/4 anxiety  Social in milieu  He is hopeful he will be ready for discharge later this week  Compliant with hs medications and slept all night

## 2021-06-27 NOTE — PLAN OF CARE
Problem: Risk for Self Injury/Neglect  Goal: Treatment Goal: Remain safe during length of stay, learn and adopt new coping skills, and be free of self-injurious ideation, impulses and acts at the time of discharge  Outcome: Progressing     Problem: Depression  Goal: Treatment Goal: Demonstrate behavioral control of depressive symptoms, verbalize feelings of improved mood/affect, and adopt new coping skills prior to discharge  Outcome: Progressing  Goal: Verbalize thoughts and feelings  Description: Interventions:  - Assess and re-assess patient's level of risk   - Engage patient in 1:1 interactions, daily, for a minimum of 15 minutes   - Encourage patient to express feelings, fears, frustrations, hopes   Outcome: Progressing  Goal: Refrain from harming self  Description: Interventions:  - Monitor patient closely, per order   - Supervise medication ingestion, monitor effects and side effects   Outcome: Progressing  Goal: Refrain from isolation  Description: Interventions:  - Develop a trusting relationship   - Encourage socialization   Outcome: Progressing  Goal: Refrain from self-neglect  Outcome: Progressing  Goal: Attend and participate in unit activities, including therapeutic, recreational, and educational groups  Description: Interventions:  - Provide therapeutic and educational activities daily, encourage attendance and participation, and document same in the medical record   Outcome: Progressing  Goal: Complete daily ADLs, including personal hygiene independently, as able  Description: Interventions:  - Observe, teach, and assist patient with ADLS  -  Monitor and promote a balance of rest/activity, with adequate nutrition and elimination   Outcome: Progressing     Problem: Anxiety  Goal: Anxiety is at manageable level  Description: Interventions:  - Assess and monitor patient's anxiety level     - Monitor for signs and symptoms (heart palpitations, chest pain, shortness of breath, headaches, nausea, feeling jumpy, restlessness, irritable, apprehensive)  - Collaborate with interdisciplinary team and initiate plan and interventions as ordered    - [place] patient to unit/surroundings  - Explain treatment plan  - Encourage participation in care  - Encourage verbalization of concerns/fears  - Identify coping mechanisms  - Assist in developing anxiety-reducing skills  - Administer/offer alternative therapies  - Limit or eliminate stimulants  Outcome: Progressing     Problem: Ineffective Coping  Goal: Participates in unit activities  Description: Interventions:  - Provide therapeutic environment   - Provide required programming   - Redirect inappropriate behaviors   Outcome: Progressing

## 2021-06-27 NOTE — NURSING NOTE
Pt reports anxiety today, states that he was getting angry  Pt reports he is easily set off at times, though requested something to help him before he got upset  Pt given PRN Ativan, which was effective  Pt is pleasant with both staff and peers most of the day  Pt offers no complaints is medication compliant

## 2021-06-27 NOTE — PROGRESS NOTES
Progress Note - [address] 62 y o  male MRN: [id number]  Unit/Bed#: [facility] 375-04 Encounter: [id number]    Assessment/Plan   Principal Problem:    Unspecified mood (affective) disorder, without psychosis  Active Problems:    PTSD (post-traumatic stress disorder)    Simple chronic bronchitis (HCC)    Lung nodules    Tobacco abuse    Medical clearance for psychiatric admission      Subjective: Patient was seen, chart reviewed and case discussed with team   Patient states he feels mildly tired today and wanted to take a nap  States that his dreams are less intense since removing nicotine patch  Denied nightmares  States he is feeling better by the day with reduction of passive death wishes  Contracts for safety  States depression anxiety are slowly getting better  Denied psychosis and delusional material   Medication compliant  Besides feeling sedated today appears to be tolerating medications well without serious side effects      Psychiatric Review of Systems:  Behavior over the last 24 hours:  improved  Sleep: normal  Appetite: normal  Medication side effects: Yes, sedated  ROS: no complaints, all others negative    Current Medications:  Current Facility-Administered Medications   Medication Dose Route Frequency    acetaminophen (TYLENOL) tablet 650 mg  650 mg Oral Q4H PRN    acetaminophen (TYLENOL) tablet 650 mg  650 mg Oral Q4H PRN    acetaminophen (TYLENOL) tablet 975 mg  975 mg Oral Q6H PRN    albuterol (PROVENTIL HFA,VENTOLIN HFA) inhaler 2 puff  2 puff Inhalation Q6H PRN    aluminum-magnesium hydroxide-simethicone (MYLANTA) oral suspension 30 mL  30 mL Oral Q4H PRN    benztropine (COGENTIN) injection 1 mg  1 mg Intramuscular Q4H PRN Max 6/day    benztropine (COGENTIN) tablet 0 5 mg  0 5 mg Oral Q4H PRN Max 6/day    FLUoxetine (PROzac) capsule 20 mg  20 mg Oral Daily    fluticasone-vilanterol (BREO ELLIPTA) 100-25 mcg/inh inhaler 1 puff  1 puff Inhalation Daily    LORazepam (ATIVAN) injection 1 mg  1 mg Intramuscular Q6H PRN Max 3/day    LORazepam (ATIVAN) tablet 0 5 mg  0 5 mg Oral Q6H PRN Max 4/day    LORazepam (ATIVAN) tablet 1 mg  1 mg Oral Q6H PRN Max 3/day    mirtazapine (REMERON) tablet 7 5 mg  7 5 mg Oral HS PRN    nicotine (NICODERM CQ) 21 mg/24 hr TD 24 hr patch 21 mg  21 mg Transdermal Daily    nicotine polacrilex (NICORETTE) gum 2 mg  2 mg Oral Q2H PRN    OLANZapine (ZyPREXA) IM injection 5 mg  5 mg Intramuscular Q3H PRN Max 3/day    OLANZapine (ZyPREXA) tablet 10 mg  10 mg Oral HS    OLANZapine (ZyPREXA) tablet 5 mg  5 mg Oral Q3H PRN Max 3/day    prazosin (MINIPRESS) capsule 1 mg  1 mg Oral HS    senna-docusate sodium (SENOKOT S) 8 6-50 mg per tablet 1 tablet  1 tablet Oral Daily PRN       Behavioral Health Medications: all current active meds have been reviewed and continue current psychiatric medications  Vitals:  Vitals:    06/27/21 0702   BP: 120/73   Pulse: 69   Resp: 18   Temp: (!) 97 2 °F (36 2 °C)   SpO2: 90%       Laboratory results:    I have personally reviewed all pertinent laboratory/tests results    Most Recent Labs:   Lab Results   Component Value Date    WBC 11 19 (H) 06/20/2021    RBC 5 17 06/20/2021    HGB 16 0 06/20/2021    HCT 46 5 06/20/2021     06/20/2021    RDW 12 8 06/20/2021    NEUTROABS 8 37 (H) 06/20/2021    SODIUM 136 06/20/2021    K 3 5 06/20/2021     06/20/2021    CO2 22 06/20/2021    BUN 8 06/20/2021    CREATININE 0 95 06/20/2021    GLUC 87 06/20/2021    GLUF 109 (H) 03/19/2019    CALCIUM 8 7 06/20/2021    AST 18 06/20/2021    ALT 23 06/20/2021    ALKPHOS 91 06/20/2021    TP 7 3 06/20/2021    ALB 4 1 06/20/2021    TBILI 0 54 06/20/2021    CHOLESTEROL 168 03/19/2019    HDL 43 03/19/2019    TRIG 137 03/19/2019    LDLCALC 98 03/19/2019    [id number] 2 459 06/20/2021    HGBA1C 4 9 04/11/2019    EAG 94 04/11/2019       Mental Status Evaluation:  Appearance:  casually dressed   Behavior:  cooperative   Speech: normal pitch and normal volume   Mood:  less depressed and anxious   Affect:  brighter   Language Appropriate   Thought Process:  normal   Thought Content:  normal   Perceptual Disturbances: None   Risk Potential: Decreasing passive death wishes  Denied HI  Potential for aggression: No   Sensorium:  person, place and time/date   Cognition:  recent and remote memory grossly intact   Consciousness:  alert and awake    Attention: attention span appeared shorter than expected for age   Insight:  partial   Judgment: improving   Gait/Station: normal gait/station and normal balance   Motor Activity: no abnormal movements     Progress Toward Goals: progressing    Recommended Treatment: Continue with pharmacotherapy, group therapy, milieu therapy and occupational therapy  1   Continue current medications    Risks, benefits and possible side effects of Medications:   Risks, benefits, and possible side effects of medications explained to patient and patient verbalizes understanding

## 2021-06-28 PROCEDURE — 99232 SBSQ HOSP IP/OBS MODERATE 35: CPT | Performed by: NURSE PRACTITIONER

## 2021-06-28 RX ADMIN — ACETAMINOPHEN 650 MG: 325 TABLET ORAL at 08:18

## 2021-06-28 RX ADMIN — FLUTICASONE FUROATE AND VILANTEROL TRIFENATATE 1 PUFF: 100; 25 POWDER RESPIRATORY (INHALATION) at 08:19

## 2021-06-28 RX ADMIN — NICOTINE 21 MG: 21 PATCH, EXTENDED RELEASE TRANSDERMAL at 08:19

## 2021-06-28 RX ADMIN — FLUOXETINE 20 MG: 20 CAPSULE ORAL at 08:18

## 2021-06-28 RX ADMIN — PHENYTOIN 1 MG: 125 SUSPENSION ORAL at 21:58

## 2021-06-28 RX ADMIN — OLANZAPINE 15 MG: 5 TABLET, FILM COATED ORAL at 21:58

## 2021-06-28 NOTE — PLAN OF CARE
Pt more receptive to engaging fully in both scheduled groups today and spontaneously express self awareness of past social and emotional challenges    Problem: Ineffective Coping  Goal: Participates in unit activities  Description: Interventions:  - Provide therapeutic environment   - Provide required programming   - Redirect inappropriate behaviors   Outcome: Progressing

## 2021-06-28 NOTE — PROGRESS NOTES
Progress Note - [address] 62 y o  male MRN: [id number]  Unit/Bed#: [facility] 270-35 Encounter: [id number]    The patient was seen for continuing care and reviewed with treatment team   Staff notes patient has been denying most symptoms although he did have some increased anxiety yesterday and had p r n  Ativan  We are planning for discharge later this week  He states his depression and anxiety are slowly improving but that he had a bad day yesterday and felt easily agitated  Said this mood lability is frequent for him and he is also having ruminating thoughts  States he was having weird dreams that triggered him  Says his suicidal thoughts are Less and less and denies current intent or plan  States he feels down a lot because my life kirsty sucks"  We discussed increasing Zyprexa verses starting Depakote for ongoing mood lability and ruminating thoughts      Unspecified mood (affective) disorder (HCC)    Vital signs in last 24 hours:  Temp:  [97 5 °F (36 4 °C)-97 7 °F (36 5 °C)] 97 7 °F (36 5 °C)  HR:  [76-83] 76  Resp:  [16-17] 17  BP: (109-146)/(59-86) 109/59    Mental Status Evaluation:    Appearance Adequate hygiene and grooming   Behavior cooperative   Mood anxious, depressed and improving   Speech Normal rate and volume   Affect appropriate   Thought Processes Goal directed and coherent   Thought Content Does not verbalize delusional material   Perceptual Disturbances Denies hallucinations and does not appear to be responding to internal stimuli   Risk Potential Suicidal/Homicidal Ideation - Suicidal Ideations without plan  Risk of Violence - No evidence of risk for violence found on assessment  Risk of Self Mutilation - No evidence of risk for self mutilation found on assessment   Sensorium oriented to person, place and time/date   Cognition/Memory recent and remote memory grossly intact   Consciousness alert and awake   Attention/Concentration attention span and concentration appear shorter than expected for age   Insight limited   Judgement limited   Muscle Strength and Gait/Station normal muscle strength and normal muscle tone, normal gait/station and normal balance   Motor Activity no abnormal movements       Progress Toward Goals:  Progressing      Recommended Treatment:  Increase Zyprexa to 15 mg HS Continue with pharmacotherapy, group therapy, milieu therapy and occupational therapy    The patient will be maintained on the following medications:  Current Facility-Administered Medications   Medication Dose Route Frequency Provider Last Rate    acetaminophen  650 mg Oral Q4H PRN Hansen Sabins, CRNP      acetaminophen  650 mg Oral Q4H PRN Hansen Sabins, CRNP      acetaminophen  975 mg Oral Q6H PRN Hansen Sabins, CRNP      albuterol  2 puff Inhalation Q6H PRN Christen Mesa PA-C      aluminum-magnesium hydroxide-simethicone  30 mL Oral Q4H PRN Hansen Sabins, CRNP      benztropine  1 mg Intramuscular Q4H PRN Max 6/day Hansen Sabins, CRNP      benztropine  0 5 mg Oral Q4H PRN Max 6/day Hansen Sabins, CRNP      FLUoxetine  20 mg Oral Daily Verona Baker MD      fluticasone-vilanterol  1 puff Inhalation Daily Christen Mesa PA-C      LORazepam  1 mg Intramuscular Q6H PRN Max 3/day Hansen Sabins, KATIE      LORazepam  0 5 mg Oral Q6H PRN Max 4/day Hansen Sabins, CRNP      LORazepam  1 mg Oral Q6H PRN Max 3/day Hansen Sabins, CRNP      mirtazapine  7 5 mg Oral HS PRN Hansen Sabins, CRNP      nicotine  21 mg Transdermal Daily Christen Mesa PA-C      nicotine polacrilex  2 mg Oral Q2H PRN Hansen Sabins, CRNP      OLANZapine  5 mg Intramuscular Q3H PRN Max 3/day Hansen Sabins, CRNP      OLANZapine  10 mg Oral HS Verona Baker MD      OLANZapine  5 mg Oral Q3H PRN Max 3/day Hansen Sabins, CRNP      prazosin  1 mg Oral HS Verona Baker MD      senna-docusate sodium  1 tablet Oral Daily PRN Hansen Sabins, CRNP         Unspecified mood (affective) disorder ([facility] )

## 2021-06-28 NOTE — CASE MANAGEMENT
06/28/21 0851   Team Meeting   Meeting Type Daily Rounds   Initial Conference Date 06/28/21   Team Members Present   Team Members Present Physician;Nurse;;; Occupational Therapist   Physician Team Member Dr Adrianne Russell; [address] Team Member FATUMA [facility] Management Team Member Kathren Dubin   Social Work Team Member Loree   OT Team Member Pablo Patino   Patient/Family Present   Patient Present No   Patient's Family Present No     Denying s/s, some anxiety yesterday -- required PRN Ativan, slept, med compliant, working on controlling his anger, 1:1 session with therapist today, anticipating d/c this week

## 2021-06-28 NOTE — NURSING NOTE
Pt sitting calm and cooperatively in dayroom  No anger or agitation reported  He is pleasant on approach, reports 5/10 depression  Denies further symptoms  Compliant with all medications

## 2021-06-28 NOTE — TREATMENT TEAM
Pt  stated awareness that he tends to jump into social relationships too easily and reported new clarity that he does not have the power to change another persons mind about him, even if he had apologized to them in the past Pt more receptive to the group process today  06/28/21 1000 06/28/21 1330   Activity/Group Checklist   Group Community meeting Life Skills  ([employer] memory game on assertiveness rights )   Attendance Attended Attended   Attendance Duration (min) 31-45 31-45   Interactions Interacted appropriately  (Pt  spoke of his bucket list lack of male coloring projects ) Interacted appropriately   Affect/Mood Appropriate;Bright;Normal range  (Pt  saw getting help as a win is challenge by making friends) Appropriate;Normal range   Goals Achieved Able to engage in interactions; Identified feelings; Discussed coping strategies Able to self-disclose; Able to reflect/comment on own behavior;Discussed coping strategies; Identified feelings; Identified triggers

## 2021-06-28 NOTE — NURSING NOTE
Pt is calm and cooperative  Pt is pleasant on approach, reports he is "doing better" no moments of anxiety today that require PRN medications  Pt reports he has been having dreams about his ex gf which have been upsetting him  Pt is medication compliant

## 2021-06-29 PROCEDURE — 99232 SBSQ HOSP IP/OBS MODERATE 35: CPT | Performed by: NURSE PRACTITIONER

## 2021-06-29 RX ADMIN — OLANZAPINE 15 MG: 5 TABLET, FILM COATED ORAL at 21:23

## 2021-06-29 RX ADMIN — FLUTICASONE FUROATE AND VILANTEROL TRIFENATATE 1 PUFF: 100; 25 POWDER RESPIRATORY (INHALATION) at 08:09

## 2021-06-29 RX ADMIN — FLUOXETINE 20 MG: 20 CAPSULE ORAL at 08:09

## 2021-06-29 RX ADMIN — NICOTINE 21 MG: 21 PATCH, EXTENDED RELEASE TRANSDERMAL at 08:10

## 2021-06-29 RX ADMIN — PHENYTOIN 1 MG: 125 SUSPENSION ORAL at 21:23

## 2021-06-29 NOTE — PROGRESS NOTES
Progress Note - [address] 62 y o  male MRN: [id number]  Unit/Bed#: Yuly Chandler 978-54 Encounter: [id number]    The patient was seen for continuing care and reviewed with treatment team   Staff notes patient has been visible and attending groups  He reports some improvement in his mood and currently rates depression as 5/10 with 10 being the worst and anxiety is 2/4 with 4 being the worst   He continues with fleeting suicidal thoughts with no intent or plan but says overall those are improving as well  He slept well last night  Appetite is good  He is tolerating medications with no side effects  We are targeting discharge by the end of the week      Unspecified mood (affective) disorder (HCC)    Vital signs in last 24 hours:  Temp:  [97 5 °F (36 4 °C)-98 2 °F (36 8 °C)] 98 2 °F (36 8 °C)  HR:  [68-83] 68  Resp:  [17-18] 17  BP: (126-148)/(60-75) 126/60    Mental Status Evaluation:    Appearance Adequate hygiene and grooming   Behavior cooperative   Mood anxious, depressed and improving   Speech Normal rate and volume   Affect appropriate   Thought Processes Goal directed and coherent   Thought Content Does not verbalize delusional material   Perceptual Disturbances Denies hallucinations and does not appear to be responding to internal stimuli   Risk Potential Suicidal/Homicidal Ideation - No evidence of suicidal or homicidal ideation and patient does not verbalize suicidal or homicidal ideation  Risk of Violence - No evidence of risk for violence found on assessment  Risk of Self Mutilation - No evidence of risk for self mutilation found on assessment   Sensorium oriented to person, place, time/date and situation   Cognition/Memory recent and remote memory grossly intact   Consciousness alert and awake   Attention/Concentration attention span and concentration appear shorter than expected for age   Insight limited   Judgement limited   Muscle Strength and Gait/Station normal muscle strength and normal muscle tone, normal gait/station and normal balance   Motor Activity no abnormal movements       Progress Toward Goals:  Progressing      Recommended Treatment: Continue with pharmacotherapy, group therapy, milieu therapy and occupational therapy    The patient will be maintained on the following medications:  Current Facility-Administered Medications   Medication Dose Route Frequency Provider Last Rate    acetaminophen  650 mg Oral Q4H PRN Gearold Span, CRNP      acetaminophen  650 mg Oral Q4H PRN Gearold Span, CRNP      acetaminophen  975 mg Oral Q6H PRN Gearold Span, CRNP      albuterol  2 puff Inhalation Q6H PRN Christen Mesa PA-C      aluminum-magnesium hydroxide-simethicone  30 mL Oral Q4H PRN Gearold Span, CRNP      benztropine  1 mg Intramuscular Q4H PRN Max 6/day Gearold Span, CRNP      benztropine  0 5 mg Oral Q4H PRN Max 6/day Gearold Span, CRNP      FLUoxetine  20 mg Oral Daily Andry Lo MD      fluticasone-vilanterol  1 puff Inhalation Daily Christen Mesa PA-C      LORazepam  1 mg Intramuscular Q6H PRN Max 3/day Gearold Span, CRNP      LORazepam  0 5 mg Oral Q6H PRN Max 4/day Gearold Span, CRNP      LORazepam  1 mg Oral Q6H PRN Max 3/day Gearold Span, CRNP      mirtazapine  7 5 mg Oral HS PRN Gearold Span, CRNP      nicotine  21 mg Transdermal Daily Christen Mesa PA-C      nicotine polacrilex  2 mg Oral Q2H PRN Gearold Span, CRNP      OLANZapine  5 mg Intramuscular Q3H PRN Max 3/day Gearold Span, CRNP      OLANZapine  15 mg Oral HS Gearold Span, CRNP      OLANZapine  5 mg Oral Q3H PRN Max 3/day Gearold Span, CRNP      prazosin  1 mg Oral HS Andry Lo MD      senna-docusate sodium  1 tablet Oral Daily PRN Gearold Span, CRNP         Unspec[facility]affective) disorder ([facility] )

## 2021-06-29 NOTE — PROGRESS NOTES
Conducted individual session with patient in his room  He had requested session to continue to talk about his anxiety and depression  The level of symptoms vary from day to day  Confirmed that he was on antidepressant medications and provided some education  He is aware that if he thinks too much of the past his depression increases  He also stated that being on the unit is not always helpful due to many patients behavior remind him of when he was talking care of his wife  Strongly recommended that he seek therapy on an OP basis and really dedicate himself in resolving/ or managing his triggers   He was concerned starting therapy he would have to go over all his past   Discussed that he need to make the decision to deal with his PTSD, depression so that he can move forward  Educated patient on selecting a therapist and recommended that he look for someone older and seasoned  He seems to improve when he talks about his past, but he is very surface and needs professional direction to deal with the issues specifically

## 2021-06-29 NOTE — NURSING NOTE
Patient was visible in the milieu socializing with select peers while watching tv/ Rate anxiety 2 on 4, depression 5 on 10, denies all other s/s  Had snack  Cooperative and compliant with HS medications  Safety checks ongoing

## 2021-06-29 NOTE — PLAN OF CARE
Pt refused to attend groups today    Problem: Ineffective Coping  Goal: Participates in unit activities  Description: Interventions:  - Provide therapeutic environment   - Provide required programming   - Redirect inappropriate behaviors   Outcome: Not Progressing

## 2021-06-29 NOTE — CASE MANAGEMENT
06/29/21 0840   Team Meeting   Meeting Type Daily Rounds   Initial Conference Date 06/29/21   Team Members Present   Team Members Present Physician;Nurse;;   Physician Team Member Dr Abisai Barraza; Dr Romo Pay; [address]  Team Member [facility] Management Team Member Colby Solid   Social Work Team Member [facility]   Patient/Family Present   Patient Present No   Patient's Family Present No     Anticipating d/c end of week, calm, cooperative, pleasant, med compliant, visible, social, 2/4 anxiety, 5/10 depression, slept

## 2021-06-29 NOTE — PLAN OF CARE
Problem: Risk for Self Injury/Neglect  Goal: Treatment Goal: Remain safe during length of stay, learn and adopt new coping skills, and be free of self-injurious ideation, impulses and acts at the time of discharge  Outcome: Progressing     Problem: Depression  Goal: Treatment Goal: Demonstrate behavioral control of depressive symptoms, verbalize feelings of improved mood/affect, and adopt new coping skills prior to discharge  Outcome: Progressing  Goal: Verbalize thoughts and feelings  Description: Interventions:  - Assess and re-assess patient's level of risk   - Engage patient in 1:1 interactions, daily, for a minimum of 15 minutes   - Encourage patient to express feelings, fears, frustrations, hopes   Outcome: Progressing  Goal: Refrain from harming self  Description: Interventions:  - Monitor patient closely, per order   - Supervise medication ingestion, monitor effects and side effects   Outcome: Progressing  Goal: Refrain from isolation  Description: Interventions:  - Develop a trusting relationship   - Encourage socialization   Outcome: Progressing  Goal: Refrain from self-neglect  Outcome: Progressing  Goal: Attend and participate in unit activities, including therapeutic, recreational, and educational groups  Description: Interventions:  - Provide therapeutic and educational activities daily, encourage attendance and participation, and document same in the medical record   Outcome: Progressing  Goal: Complete daily ADLs, including personal hygiene independently, as able  Description: Interventions:  - Observe, teach, and assist patient with ADLS  -  Monitor and promote a balance of rest/activity, with adequate nutrition and elimination   Outcome: Progressing     Problem: Anxiety  Goal: Anxiety is at manageable level  Description: Interventions:  - Assess and monitor patient's anxiety level     - Monitor for signs and symptoms (heart palpitations, chest pain, shortness of breath, headaches, nausea, feeling jumpy, restlessness, irritable, apprehensive)  - Collaborate with interdisciplinary team and initiate plan and interventions as ordered    - [place] patient to unit/surroundings  - Explain treatment plan  - Encourage participation in care  - Encourage verbalization of concerns/fears  - Identify coping mechanisms  - Assist in developing anxiety-reducing skills  - Administer/offer alternative therapies  - Limit or eliminate stimulants  Outcome: Progressing     Problem: Ineffective Coping  Goal: Participates in unit activities  Description: Interventions:  - Provide therapeutic environment   - Provide required programming   - Redirect inappropriate behaviors   Outcome: Progressing     Problem: DISCHARGE PLANNING  Goal: Discharge to home or other facility with appropriate resources  Description: INTERVENTIONS:  - Identify barriers to discharge w/patient and caregiver  - Arrange for needed discharge resources and transportation as appropriate  - Identify discharge learning needs (meds, wound care, etc )  - Arrange for interpretive services to assist at discharge as needed  - Refer to Case Management Department for coordinating discharge planning if the patient needs post-hospital services based on physician/advanced practitioner order or complex needs related to functional status, cognitive ability, or social support system  Outcome: Progressing

## 2021-06-29 NOTE — PLAN OF CARE
Problem: Risk for Self Injury/Neglect  Goal: Treatment Goal: Remain safe during length of stay, learn and adopt new coping skills, and be free of self-injurious ideation, impulses and acts at the time of discharge  Outcome: Progressing     Problem: Ineffective Coping  Goal: Participates in unit activities  Description: Interventions:  - Provide therapeutic environment   - Provide required programming   - Redirect inappropriate behaviors   Outcome: Progressing

## 2021-06-29 NOTE — NURSING NOTE
Pt alert and visible on the unit  Pt reported depression 5/10 and anxiety 2/4  Stated si is "on and off" and moved hand from side to side  Pt guarded and would not disclose a lot of info  Did report that he "hoped" he would call someone if he became suicidal again  Stated, "I have three really good friends and if I did anything they would get really at me"  Discuss ways to intervene or ask for help prior to responding to thoughts  Insight is improving  Will continue to support and maintain q 7 min checks

## 2021-06-30 PROCEDURE — 99232 SBSQ HOSP IP/OBS MODERATE 35: CPT | Performed by: NURSE PRACTITIONER

## 2021-06-30 RX ADMIN — LORAZEPAM 0.5 MG: 0.5 TABLET ORAL at 12:50

## 2021-06-30 RX ADMIN — OLANZAPINE 15 MG: 5 TABLET, FILM COATED ORAL at 21:33

## 2021-06-30 RX ADMIN — NICOTINE 21 MG: 21 PATCH, EXTENDED RELEASE TRANSDERMAL at 08:38

## 2021-06-30 RX ADMIN — FLUOXETINE 20 MG: 20 CAPSULE ORAL at 08:37

## 2021-06-30 RX ADMIN — FLUTICASONE FUROATE AND VILANTEROL TRIFENATATE 1 PUFF: 100; 25 POWDER RESPIRATORY (INHALATION) at 08:37

## 2021-06-30 RX ADMIN — PHENYTOIN 1 MG: 125 SUSPENSION ORAL at 21:32

## 2021-06-30 NOTE — NURSING NOTE
Pt expressed feeling anxious earlier in day due to incident involving another peer  Pt denies anxiety currently  Visible on unit, social with select peer  No additional symptoms reported during shift

## 2021-06-30 NOTE — PLAN OF CARE
Problem: Depression  Goal: Verbalize thoughts and feelings  Description: Interventions:  - Assess and re-assess patient's level of risk   - Engage patient in 1:1 interactions, daily, for a minimum of 15 minutes   - Encourage patient to express feelings, fears, frustrations, hopes   Outcome: Not Progressing  Goal: Refrain from isolation  Description: Interventions:  - Develop a trusting relationship   - Encourage socialization   Outcome: Not Progressing  Goal: Attend and participate in unit activities, including therapeutic, recreational, and educational groups  Description: Interventions:  - Provide therapeutic and educational activities daily, encourage attendance and participation, and document same in the medical record   Outcome: Not Progressing

## 2021-06-30 NOTE — PROGRESS NOTES
Progress Note - [address] 62 y o  male MRN: [id number]  Unit/Bed#: Jaydon Perkins 377-47 Encounter: [id number]    The patient was seen for continuing care and reviewed with treatment team   Staff notes patient has been visible and social but did not attend any groups yesterday  This morning he says he is starting to feel a little bit better and rates his depression as 5/10 10 being the worst and anxiety as 2/4 4 being the worst   He said yesterday he had a good day and he came to the realization that my spirit and heart was broken and that is why he came to the hospital   He says since realizing that it has been easier for him to cope  His sleep is improving and he is not really having bad dreams anymore  Appetite is improving  Regarding suicidal thoughts he states they are not as bad as it has been and denies intent or plan currently  He is tolerating the medications with the exception of some fatigue in the morning      Unspecified mood (affective) disorder (HCC)    Vital signs in last 24 hours:  Temp:  [97 5 °F (36 4 °C)-98 °F (36 7 °C)] 98 °F (36 7 °C)  HR:  [68-81] 80  Resp:  [16-18] 18  BP: (126-139)/(72-86) 126/84    Mental Status Evaluation:    Appearance Adequate hygiene and grooming   Behavior cooperative   Mood anxious, depressed and improving   Speech Normal rate and volume   Affect appropriate   Thought Processes Goal directed and coherent   Thought Content Does not verbalize delusional material   Perceptual Disturbances Denies hallucinations and does not appear to be responding to internal stimuli   Risk Potential Suicidal/Homicidal Ideation - No evidence of suicidal or homicidal ideation and patient does not verbalize suicidal or homicidal ideation  Risk of Violence - No evidence of risk for violence found on assessment  Risk of Self Mutilation - No evidence of risk for self mutilation found on assessment   Sensorium oriented to person, place and time/date Cognition/Memory recent and remote memory grossly intact   Consciousness alert and awake   Attention/Concentration attention span and concentration appear shorter than expected for age   Insight limited   Judgement limited   Muscle Strength and Gait/Station normal muscle strength and normal muscle tone, normal gait/station and normal balance   Motor Activity no abnormal movements       Progress Toward Goals:  Progressing      Recommended Treatment: Continue with pharmacotherapy, group therapy, milieu therapy and occupational therapy    The patient will be maintained on the following medications:  Current Facility-Administered Medications   Medication Dose Route Frequency Provider Last Rate    acetaminophen  650 mg Oral Q4H PRN Thuy Tate, CRNP      acetaminophen  650 mg Oral Q4H PRN Thuy Tate, CRNP      acetaminophen  975 mg Oral Q6H PRN Thuy Tate, CRNP      albuterol  2 puff Inhalation Q6H PRN Christen Mesa PA-C      aluminum-magnesium hydroxide-simethicone  30 mL Oral Q4H PRN Thuy Tate, JOBNP      benztropine  1 mg Intramuscular Q4H PRN Max 6/day Thuy Tate, JOBNP      benztropine  0 5 mg Oral Q4H PRN Max 6/day Thuy Tate, KATIE      FLUoxetine  20 mg Oral Daily Linh Xie MD      fluticasone-vilanterol  1 puff Inhalation Daily Christen Mesa PA-C      LORazepam  1 mg Intramuscular Q6H PRN Max 3/day KATIE Laughlin      LORazepam  0 5 mg Oral Q6H PRN Max 4/day Thuy Tate, KATIE      LORazepam  1 mg Oral Q6H PRN Max 3/day KATIE Laughlin      mirtazapine  7 5 mg Oral HS PRN Thuy Tate, KATIE      nicotine  21 mg Transdermal Daily Christen Mesa PA-C      nicotine polacrilex  2 mg Oral Q2H PRN Thuy Tate, KATIE      OLANZapine  5 mg Intramuscular Q3H PRN Max 3/day Thuy Tate, KATIE      OLANZapine  15 mg Oral HS Thuy Tate, CRNP      OLANZapine  5 mg Oral Q3H PRN Max 3/day Thuy Tate, KATIE      prazosin  1 mg Oral HS Linh Xie MD  senna-docusate sodium  1 tablet Oral Daily PRN KATIE Mayberry         Unspecified mood [facility]affective) disorder ([facility] )

## 2021-06-30 NOTE — NURSING NOTE
Patient was visible in the milieu socializing with select peers  VSS  Rate anxiety 2 on 4, depression 5 on 10, denies all other s/s at this 1time  Had snack  Cooperative and compliant with HS medications  Safety checks ongoing

## 2021-06-30 NOTE — PLAN OF CARE
Problem: Risk for Self Injury/Neglect  Goal: Treatment Goal: Remain safe during length of stay, learn and adopt new coping skills, and be free of self-injurious ideation, impulses and acts at the time of discharge  Outcome: Progressing     Problem: Depression  Goal: Treatment Goal: Demonstrate behavioral control of depressive symptoms, verbalize feelings of improved mood/affect, and adopt new coping skills prior to discharge  Outcome: Progressing  Goal: Verbalize thoughts and feelings  Description: Interventions:  - Assess and re-assess patient's level of risk   - Engage patient in 1:1 interactions, daily, for a minimum of 15 minutes   - Encourage patient to express feelings, fears, frustrations, hopes   Outcome: Progressing  Goal: Refrain from harming self  Description: Interventions:  - Monitor patient closely, per order   - Supervise medication ingestion, monitor effects and side effects   Outcome: Progressing  Goal: Refrain from isolation  Description: Interventions:  - Develop a trusting relationship   - Encourage socialization   Outcome: Progressing  Goal: Refrain from self-neglect  Outcome: Progressing  Goal: Attend and participate in unit activities, including therapeutic, recreational, and educational groups  Description: Interventions:  - Provide therapeutic and educational activities daily, encourage attendance and participation, and document same in the medical record   Outcome: Progressing  Goal: Complete daily ADLs, including personal hygiene independently, as able  Description: Interventions:  - Observe, teach, and assist patient with ADLS  -  Monitor and promote a balance of rest/activity, with adequate nutrition and elimination   Outcome: Progressing     Problem: Anxiety  Goal: Anxiety is at manageable level  Description: Interventions:  - Assess and monitor patient's anxiety level     - Monitor for signs and symptoms (heart palpitations, chest pain, shortness of breath, headaches, nausea, feeling jumpy, restlessness, irritable, apprehensive)  - Collaborate with interdisciplinary team and initiate plan and interventions as ordered    - [place] patient to unit/surroundings  - Explain treatment plan  - Encourage participation in care  - Encourage verbalization of concerns/fears  - Identify coping mechanisms  - Assist in developing anxiety-reducing skills  - Administer/offer alternative therapies  - Limit or eliminate stimulants  Outcome: Progressing

## 2021-06-30 NOTE — NURSING NOTE
Pt alert and visible on the unit for meals only  Pt did not attend am groups  Continues to report depression 5/10 and anxiety 2/4  When asked if he felt any different than yesterday stated, "I don't feel worse"  Denies current si  Guarded and evasive  Group attendance encouraged but he declined  Pt's roommate became upset in the dining room d/t meal selection and pt then reported that he "was going to lose it on him and couldn't trust what he'd do if he acted like that again"  Pt's room to be changed  Pt received ativan 0 5mg for c/o anxiety  Appetite 100% for breakfast  Will continue to monitor and maintain q 7 min checks

## 2021-06-30 NOTE — CASE MANAGEMENT
06/30/21 0849   Team Meeting   Meeting Type Daily Rounds   Initial Conference Date 06/30/21   Team Members Present   Team Members Present Physician;Nurse;   Physician Team Member Dr Ramon Umana; [address]   Nursing Team Member [facility] Management Team Member Lizzy   Patient/Family Present   Patient Present No   Patient's Family Present No     visible, 2/4 anxiety, 5/10 depression, denies other s/s, med compliant, cooperative, pleasant, no groups yesterday, slept

## 2021-07-01 PROCEDURE — 99232 SBSQ HOSP IP/OBS MODERATE 35: CPT | Performed by: NURSE PRACTITIONER

## 2021-07-01 RX ADMIN — FLUOXETINE 20 MG: 20 CAPSULE ORAL at 08:15

## 2021-07-01 RX ADMIN — OLANZAPINE 15 MG: 5 TABLET, FILM COATED ORAL at 21:17

## 2021-07-01 RX ADMIN — PHENYTOIN 1 MG: 125 SUSPENSION ORAL at 21:17

## 2021-07-01 RX ADMIN — NICOTINE 21 MG: 21 PATCH, EXTENDED RELEASE TRANSDERMAL at 08:17

## 2021-07-01 RX ADMIN — FLUTICASONE FUROATE AND VILANTEROL TRIFENATATE 1 PUFF: 100; 25 POWDER RESPIRATORY (INHALATION) at 08:16

## 2021-07-01 NOTE — PROGRESS NOTES
Pt refused to complete [facility] relapse prevention plan at this time stating he was not ready       07/01/21 1440   Activity/Group Checklist   Group Admission/Discharge   Attendance Refused

## 2021-07-01 NOTE — PLAN OF CARE
Problem: Risk for Self Injury/Neglect  Goal: Treatment Goal: Remain safe during length of stay, learn and adopt new coping skills, and be free of self-injurious ideation, impulses and acts at the time of discharge  Outcome: Progressing     Problem: Depression  Goal: Treatment Goal: Demonstrate behavioral control of depressive symptoms, verbalize feelings of improved mood/affect, and adopt new coping skills prior to discharge  Outcome: Progressing  Goal: Verbalize thoughts and feelings  Description: Interventions:  - Assess and re-assess patient's level of risk   - Engage patient in 1:1 interactions, daily, for a minimum of 15 minutes   - Encourage patient to express feelings, fears, frustrations, hopes   Outcome: Progressing  Goal: Refrain from harming self  Description: Interventions:  - Monitor patient closely, per order   - Supervise medication ingestion, monitor effects and side effects   Outcome: Progressing  Goal: Refrain from isolation  Description: Interventions:  - Develop a trusting relationship   - Encourage socialization   Outcome: Progressing  Goal: Refrain from self-neglect  Outcome: Progressing  Goal: Attend and participate in unit activities, including therapeutic, recreational, and educational groups  Description: Interventions:  - Provide therapeutic and educational activities daily, encourage attendance and participation, and document same in the medical record   Outcome: Progressing  Goal: Complete daily ADLs, including personal hygiene independently, as able  Description: Interventions:  - Observe, teach, and assist patient with ADLS  -  Monitor and promote a balance of rest/activity, with adequate nutrition and elimination   Outcome: Progressing     Problem: Anxiety  Goal: Anxiety is at manageable level  Description: Interventions:  - Assess and monitor patient's anxiety level     - Monitor for signs and symptoms (heart palpitations, chest pain, shortness of breath, headaches, nausea, feeling jumpy, restlessness, irritable, apprehensive)  - Collaborate with interdisciplinary team and initiate plan and interventions as ordered    - [place] patient to unit/surroundings  - Explain treatment plan  - Encourage participation in care  - Encourage verbalization of concerns/fears  - Identify coping mechanisms  - Assist in developing anxiety-reducing skills  - Administer/offer alternative therapies  - Limit or eliminate stimulants  Outcome: Progressing

## 2021-07-01 NOTE — PROGRESS NOTES
Pt attended all groups  Pt was able to express his thoughts on anger  Pt noted he had feelings of: overwhelmed, disappointed, embarrassment ,sadness, insecurity, helpless and shame  Pt address the stigma of MH and dealing with people he thought were "friends"  Pt addressed past actions and trying to understand triggers  Pt noticed he had displaced anger  Pt was able to find commonality with peers  07/01/21 1330   Activity/Group Checklist   Group Other (Comment)  (anger management and MH recovery)   Attendance Attended   Attendance Duration (min) 31-45   Interactions Interacted appropriately   Affect/Mood Blunted/flat   Goals Achieved Identified feelings; Identified relapse prevention strategies; Able to listen to others; Able to engage in interactions; Able to reflect/comment on own behavior;Able to manage/cope with feelings;Verbalized increased hopefulness; Able to self-disclose
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
## 2021-07-01 NOTE — CASE MANAGEMENT
07/01/21 0855   Team Meeting   Meeting Type Daily Rounds   Initial Conference Date 07/01/21   Team Members Present   Team Members Present Physician;Nurse;;   Physician Team Member Dr Oriana Caldwell; [address] Domingo   Nursing Team Member [facility] Management Team Member Marlin Leiva   Social Work Team Member Jen Barron   Patient/Family Present   Patient Present No   Patient's Family Present No     No groups yesterday, med compliant, room changed yesterday, anxiety yesterday due to another peer's behavior, visible, social, slept, discharge next week

## 2021-07-01 NOTE — NURSING NOTE
Pt visible on unit  Pt anxiety 2/4 and depression 5/10  Pt denies SI/HI  Pt cooperative and medication compliant  Needs met at this time

## 2021-07-01 NOTE — PROGRESS NOTES
Reviewed chart notes for update and progress  Patient continues to have many triggers due to caring for his wife who past away several years ago  During one session another patient screamed and he froze    Recommending to patient that he select a therapist for OP services that deals with PTSD, Depression

## 2021-07-02 PROCEDURE — 99232 SBSQ HOSP IP/OBS MODERATE 35: CPT | Performed by: NURSE PRACTITIONER

## 2021-07-02 RX ORDER — LANOLIN ALCOHOL/MO/W.PET/CERES
3 CREAM (GRAM) TOPICAL
Status: DISCONTINUED | OUTPATIENT
Start: 2021-07-02 | End: 2021-07-06 | Stop reason: HOSPADM

## 2021-07-02 RX ADMIN — NICOTINE 21 MG: 21 PATCH, EXTENDED RELEASE TRANSDERMAL at 09:10

## 2021-07-02 RX ADMIN — ALBUTEROL SULFATE 2 PUFF: 90 AEROSOL, METERED RESPIRATORY (INHALATION) at 18:55

## 2021-07-02 RX ADMIN — OLANZAPINE 15 MG: 5 TABLET, FILM COATED ORAL at 21:07

## 2021-07-02 RX ADMIN — FLUTICASONE FUROATE AND VILANTEROL TRIFENATATE 1 PUFF: 100; 25 POWDER RESPIRATORY (INHALATION) at 09:14

## 2021-07-02 RX ADMIN — PHENYTOIN 1 MG: 125 SUSPENSION ORAL at 21:07

## 2021-07-02 RX ADMIN — FLUOXETINE 20 MG: 20 CAPSULE ORAL at 09:10

## 2021-07-02 RX ADMIN — MELATONIN TAB 3 MG 3 MG: 3 TAB at 21:07

## 2021-07-02 NOTE — PLAN OF CARE
Pt  Engaged in two of three groups    Problem: Ineffective Coping  Goal: Participates in unit activities  Description: Interventions:  - Provide therapeutic environment   - Provide required programming   - Redirect inappropriate behaviors   Outcome: Progressing

## 2021-07-02 NOTE — NURSING NOTE
Pt attended am group  Reported depression 5/10 and anxiety 2/4  Stated, "I don't have suicidal thoughts today for the first time"  Brighter but remains guarded and evasive  Appetite is 100% for breakfast and 75% for lunch  Will continue to monitor and maintain q 7 min checks

## 2021-07-02 NOTE — NURSING NOTE
Pt calm and cooperative, visible on unit  Social with select peers  No symptoms reported  Medication compliant

## 2021-07-02 NOTE — PROGRESS NOTES
Pt alert and interested in [employer] task yet post session pt, declined again,to work on a relapse prevention plan stated that he had enough information at home  07/02/21 1000   Activity/Group Checklist   Group Community meeting  (4th of July [facility])   Attendance Attended   Attendance Duration (min) 31-45   Interactions Interacted appropriately   Affect/Mood Appropriate;Calm;Normal range   Goals Achieved Able to engage in interactions; Able to listen to others
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
## 2021-07-02 NOTE — PLAN OF CARE
Problem: Risk for Self Injury/Neglect  Goal: Treatment Goal: Remain safe during length of stay, learn and adopt new coping skills, and be free of self-injurious ideation, impulses and acts at the time of discharge  Outcome: Progressing     Problem: Depression  Goal: Treatment Goal: Demonstrate behavioral control of depressive symptoms, verbalize feelings of improved mood/affect, and adopt new coping skills prior to discharge  Outcome: Progressing  Goal: Verbalize thoughts and feelings  Description: Interventions:  - Assess and re-assess patient's level of risk   - Engage patient in 1:1 interactions, daily, for a minimum of 15 minutes   - Encourage patient to express feelings, fears, frustrations, hopes   Outcome: Progressing  Goal: Refrain from harming self  Description: Interventions:  - Monitor patient closely, per order   - Supervise medication ingestion, monitor effects and side effects   Outcome: Progressing  Goal: Refrain from isolation  Description: Interventions:  - Develop a trusting relationship   - Encourage socialization   Outcome: Progressing  Goal: Refrain from self-neglect  Outcome: Progressing  Goal: Attend and participate in unit activities, including therapeutic, recreational, and educational groups  Description: Interventions:  - Provide therapeutic and educational activities daily, encourage attendance and participation, and document same in the medical record   Outcome: Progressing  Goal: Complete daily ADLs, including personal hygiene independently, as able  Description: Interventions:  - Observe, teach, and assist patient with ADLS  -  Monitor and promote a balance of rest/activity, with adequate nutrition and elimination   Outcome: Progressing     Problem: Anxiety  Goal: Anxiety is at manageable level  Description: Interventions:  - Assess and monitor patient's anxiety level     - Monitor for signs and symptoms (heart palpitations, chest pain, shortness of breath, headaches, nausea, feeling jumpy, restlessness, irritable, apprehensive)  - Collaborate with interdisciplinary team and initiate plan and interventions as ordered    - [place] patient to unit/surroundings  - Explain treatment plan  - Encourage participation in care  - Encourage verbalization of concerns/fears  - Identify coping mechanisms  - Assist in developing anxiety-reducing skills  - Administer/offer alternative therapies  - Limit or eliminate stimulants  Outcome: Progressing

## 2021-07-02 NOTE — CASE MANAGEMENT
07/02/21 0841   Team Meeting   Meeting Type Daily Rounds   Initial Conference Date 07/02/21   Team Members Present   Team Members Present Physician;Nurse;;   Physician Team Member Dr Charissa Hunt; [address] Domingo   Nursing Team Member [facility] Management Team Member Rufina Chen   Social Work Team Member Willis Meyers   Patient/Family Present   Patient Present No   Patient's Family Present No     Anticipating d/c on Tues; calm, cooperative, social, visible, denies s/s, med compliant, slept, irritable edge due to getting a roommate

## 2021-07-02 NOTE — PROGRESS NOTES
07/02/21 1000 07/02/21 1100 07/02/21 1330   Activity/Group Checklist   Group Community meeting  (4th of July [facility]) Other (Comment)  ( recovery and wellness) Life Skills   Attendance Attended Attended Did not attend  (in bed)   Attendance Duration (min) 31-45 31-45  --    Interactions Interacted appropriately Interacted appropriately  --    Affect/Mood Appropriate;Calm;Normal range Bright  --    Goals Achieved Able to engage in interactions; Able to listen to others Identified feelings; Identified relapse prevention strategies; Discussed coping strategies; Able to listen to others; Able to engage in interactions; Able to reflect/comment on own behavior;Able to self-disclose  --

## 2021-07-02 NOTE — PROGRESS NOTES
Progress Note - [address] 62 y o  male MRN: [id number]  Unit/Bed#: Avni Hoang 096-25 Encounter: [id number]    The patient was seen for continuing care and reviewed with treatment team   Staff notes patient has been denying all symptoms  He had an irritable edge yesterday after getting a roommate  He has been medication compliant and cooperative  He did attend all scheduled groups yesterday and interacted appropriately  We are planning for discharge Tuesday  This morning he says he is feeling very tired because his new roommate was crying and talking in his sleep all night and kept him up night  He requested melatonin to help him sleep tonight  He said he is still feeling anxious and depressed but it is improving and he is no longer having any suicidal thoughts  He is looking forward to discharge next week  His major complaint is that he wants to find a new place to live  Appetite has been good  Denies side effects to medications      Unspecified mood (affective) disorder (HCC)    Vital signs in last 24 hours:  Temp:  [97 5 °F (36 4 °C)-98 1 °F (36 7 °C)] 98 1 °F (36 7 °C)  HR:  [] 76  Resp:  [16-18] 18  BP: (124-134)/(65-79) 134/79    Mental Status Evaluation:    Appearance Adequate hygiene and grooming   Behavior calm and cooperative   Mood anxious and depressed   Speech Normal rate and volume   Affect appropriate   Thought Processes Goal directed and coherent   Thought Content Does not verbalize delusional material   Perceptual Disturbances Denies hallucinations and does not appear to be responding to internal stimuli   Risk Potential Suicidal/Homicidal Ideation - No evidence of suicidal or homicidal ideation and patient does not verbalize suicidal or homicidal ideation  Risk of Violence - No evidence of risk for violence found on assessment  Risk of Self Mutilation - No evidence of risk for self mutilation found on assessment   Sensorium oriented to person, place, time/date and situation   Cognition/Memory recent and remote memory grossly intact   Consciousness alert and awake   Attention/Concentration attention span and concentration appear shorter than expected for age   Insight limited   Judgement limited   Muscle Strength and Gait/Station normal muscle strength and normal muscle tone, normal gait/station and normal balance   Motor Activity no abnormal movements       Progress Toward Goals:  Progressing      Recommended Treatment: Continue with pharmacotherapy, group therapy, milieu therapy and occupational therapy    The patient will be maintained on the following medications:  Current Facility-Administered Medications   Medication Dose Route Frequency Provider Last Rate    acetaminophen  650 mg Oral Q4H PRN Brown Siemens, CRNP      acetaminophen  650 mg Oral Q4H PRN Brown Siemens, CRNP      acetaminophen  975 mg Oral Q6H PRN Brown Siemens, CRNP      albuterol  2 puff Inhalation Q6H PRN Christen Mesa PA-C      aluminum-magnesium hydroxide-simethicone  30 mL Oral Q4H PRN Brown Siemens, CRNP      benztropine  1 mg Intramuscular Q4H PRN Max 6/day Brown Siemens, CRNP      benztropine  0 5 mg Oral Q4H PRN Max 6/day Brown Siemens, CRNP      FLUoxetine  20 mg Oral Daily Polina Rose MD      fluticasone-vilanterol  1 puff Inhalation Daily Christen Mesa PA-C      LORazepam  1 mg Intramuscular Q6H PRN Max 3/day Brown Siemens, CRNP      LORazepam  0 5 mg Oral Q6H PRN Max 4/day Brown Siemens, CRNP      LORazepam  1 mg Oral Q6H PRN Max 3/day Brown Siemens, CRNP      mirtazapine  7 5 mg Oral HS PRN Brown Siemens, CRNP      nicotine  21 mg Transdermal Daily Christen Mesa PA-C      nicotine polacrilex  2 mg Oral Q2H PRN Brown Siemens, CRNP      OLANZapine  5 mg Intramuscular Q3H PRN Max 3/day Brown Siemens, CRNP      OLANZapine  15 mg Oral HS Brown Siemens, CRNP      OLANZapine  5 mg Oral Q3H PRN Max 3/day Brown Siemens, CRNP      prazosin  1 mg Oral HS Renaldo Arthur Maulik Wood MD      senna-docusate sodium  1 tablet Oral Daily PRN Blondell KATIE You         Unspecified mood (affective) disorder ([facility] 75 )

## 2021-07-03 PROCEDURE — 99232 SBSQ HOSP IP/OBS MODERATE 35: CPT | Performed by: PSYCHIATRY & NEUROLOGY

## 2021-07-03 RX ADMIN — FLUOXETINE 20 MG: 20 CAPSULE ORAL at 09:01

## 2021-07-03 RX ADMIN — MELATONIN TAB 3 MG 3 MG: 3 TAB at 21:20

## 2021-07-03 RX ADMIN — PHENYTOIN 1 MG: 125 SUSPENSION ORAL at 21:20

## 2021-07-03 RX ADMIN — OLANZAPINE 15 MG: 5 TABLET, FILM COATED ORAL at 21:20

## 2021-07-03 RX ADMIN — NICOTINE 21 MG: 21 PATCH, EXTENDED RELEASE TRANSDERMAL at 09:02

## 2021-07-03 RX ADMIN — FLUTICASONE FUROATE AND VILANTEROL TRIFENATATE 1 PUFF: 100; 25 POWDER RESPIRATORY (INHALATION) at 09:01

## 2021-07-03 NOTE — PLAN OF CARE
Problem: Risk for Self Injury/Neglect  Goal: Treatment Goal: Remain safe during length of stay, learn and adopt new coping skills, and be free of self-injurious ideation, impulses and acts at the time of discharge  Outcome: Progressing     Problem: Depression  Goal: Treatment Goal: Demonstrate behavioral control of depressive symptoms, verbalize feelings of improved mood/affect, and adopt new coping skills prior to discharge  Outcome: Progressing  Goal: Verbalize thoughts and feelings  Description: Interventions:  - Assess and re-assess patient's level of risk   - Engage patient in 1:1 interactions, daily, for a minimum of 15 minutes   - Encourage patient to express feelings, fears, frustrations, hopes   Outcome: Progressing  Goal: Refrain from harming self  Description: Interventions:  - Monitor patient closely, per order   - Supervise medication ingestion, monitor effects and side effects   Outcome: Progressing  Goal: Refrain from isolation  Description: Interventions:  - Develop a trusting relationship   - Encourage socialization   Outcome: Progressing  Goal: Refrain from self-neglect  Outcome: Progressing  Goal: Attend and participate in unit activities, including therapeutic, recreational, and educational groups  Description: Interventions:  - Provide therapeutic and educational activities daily, encourage attendance and participation, and document same in the medical record   Outcome: Progressing  Goal: Complete daily ADLs, including personal hygiene independently, as able  Description: Interventions:  - Observe, teach, and assist patient with ADLS  -  Monitor and promote a balance of rest/activity, with adequate nutrition and elimination   Outcome: Progressing     Problem: Anxiety  Goal: Anxiety is at manageable level  Description: Interventions:  - Assess and monitor patient's anxiety level     - Monitor for signs and symptoms (heart palpitations, chest pain, shortness of breath, headaches, nausea, feeling jumpy, restlessness, irritable, apprehensive)  - Collaborate with interdisciplinary team and initiate plan and interventions as ordered    - [place] patient to unit/surroundings  - Explain treatment plan  - Encourage participation in care  - Encourage verbalization of concerns/fears  - Identify coping mechanisms  - Assist in developing anxiety-reducing skills  - Administer/offer alternative therapies  - Limit or eliminate stimulants  Outcome: Progressing

## 2021-07-03 NOTE — PROGRESS NOTES
C/O" I am okay'    Report from staff regarding this patient received and record reviewed  prior to seeing this patient   Behavior over the last 24 hours: This patient is seen on the inpatient unit for ongoing psychiatric treatment for his mood disorder he is taking medication denied any side effects  He does easily get it  Sleep:  OK  Appetite:  Okay  Medication side effects:  Denies  ROS:  Mood  Mental Status Evaluation:  Appearance:  Dressed appropraitely   Behavior:  cooperative   Speech:  normal   Mood:  Irritable   Affect:  appropriate    Thought Process:  Goal directed   Thought Content:  normal   Perceptual Disturbances: Denied AV hallucination   Risk Potential: NO ALECIA    Sensorium:  normal   Cognition:  intact   Consciousness:  Alert, a bit   Attention: Fair   Insight:  Limited   Judgment: Limited   Gait/Station: With in normal range   Motor Activity: With in normal range     Progress Toward Goals: working on current treatment goals, no changes  Made in treatment plan   Recommended Treatment: Continue with group therapy, milieu therapy and occupational therapy  Risks, benefits and possible side effects of Medications:   Risks, benefits, and possible side effects of medications explained to patient and patient verbalizes understanding        Medications:   all current active meds have been reviewed and current meds:   Current Facility-Administered Medications   Medication Dose Route Frequency    acetaminophen (TYLENOL) tablet 650 mg  650 mg Oral Q4H PRN    acetaminophen (TYLENOL) tablet 650 mg  650 mg Oral Q4H PRN    acetaminophen (TYLENOL) tablet 975 mg  975 mg Oral Q6H PRN    albuterol (PROVENTIL HFA,VENTOLIN HFA) inhaler 2 puff  2 puff Inhalation Q6H PRN    aluminum-magnesium hydroxide-simethicone (MYLANTA) oral suspension 30 mL  30 mL Oral Q4H PRN    benztropine (COGENTIN) injection 1 mg  1 mg Intramuscular Q4H PRN Max 6/day    benztropine (COGENTIN) tablet 0 5 mg  0 5 mg Oral Q4H PRN Max 6/day    FLUoxetine (PROzac) capsule 20 mg  20 mg Oral Daily    fluticasone-vilanterol (BREO ELLIPTA) 100-25 mcg/inh inhaler 1 puff  1 puff Inhalation Daily    LORazepam (ATIVAN) injection 1 mg  1 mg Intramuscular Q6H PRN Max 3/day    LORazepam (ATIVAN) tablet 0 5 mg  0 5 mg Oral Q6H PRN Max 4/day    LORazepam (ATIVAN) tablet 1 mg  1 mg Oral Q6H PRN Max 3/day    melatonin tablet 3 mg  3 mg Oral HS    mirtazapine (REMERON) tablet 7 5 mg  7 5 mg Oral HS PRN    nicotine (NICODERM CQ) 21 mg/24 hr TD 24 hr patch 21 mg  21 mg Transdermal Daily    nicotine polacrilex (NICORETTE) gum 2 mg  2 mg Oral Q2H PRN    OLANZapine (ZyPREXA) IM injection 5 mg  5 mg Intramuscular Q3H PRN Max 3/day    OLANZapine (ZyPREXA) tablet 15 mg  15 mg Oral HS    OLANZapine (ZyPREXA) tablet 5 mg  5 mg Oral Q3H PRN Max 3/day    prazosin (MINIPRESS) capsule 1 mg  1 mg Oral HS    senna-docusate sodium (SENOKOT S) 8 6-50 mg per tablet 1 tablet  1 tablet Oral Daily PRN     Labs: NA    Assessment, Diagnosis  and Plan: continue with current meds and goals, F/U tomorrow    Counseling / Coordination of Care  Total floor / unit time spent today20 minutes  minutes  Greater than 50% of total time was spent with the patient and / or family counseling and / or coordination of care  A description of the counseling / coordination of care:      Holly Lepe MD

## 2021-07-03 NOTE — NURSING NOTE
Patient is alert and oriented x4  Pt present in day room, selectively social  Quiet, cooperative  Rates depression 6/10 anxiety 2/4  Denies SI/HI/AH/VH  States he is "stressed about home and would like to find a new place to live " Also states that he would "just like to rest"  Med compliant

## 2021-07-03 NOTE — NURSING NOTE
Pt cooperative and visible in milieu  Limited interaction with peers or staff  Denies SI  Compliant with medications and slept all night

## 2021-07-04 PROCEDURE — 99232 SBSQ HOSP IP/OBS MODERATE 35: CPT | Performed by: PSYCHIATRY & NEUROLOGY

## 2021-07-04 RX ADMIN — PHENYTOIN 1 MG: 125 SUSPENSION ORAL at 21:33

## 2021-07-04 RX ADMIN — FLUOXETINE 20 MG: 20 CAPSULE ORAL at 08:16

## 2021-07-04 RX ADMIN — MELATONIN TAB 3 MG 3 MG: 3 TAB at 21:33

## 2021-07-04 RX ADMIN — FLUTICASONE FUROATE AND VILANTEROL TRIFENATATE 1 PUFF: 100; 25 POWDER RESPIRATORY (INHALATION) at 08:15

## 2021-07-04 RX ADMIN — OLANZAPINE 15 MG: 5 TABLET, FILM COATED ORAL at 21:33

## 2021-07-04 RX ADMIN — NICOTINE 21 MG: 21 PATCH, EXTENDED RELEASE TRANSDERMAL at 08:16

## 2021-07-04 NOTE — PROGRESS NOTES
C/O"feel down ,wish I was at my home on this holiday "    Report from staff regarding this patient received and record reviewed  prior to seeing this patient   Behavior over the last 24 hours:  Reports that he was hoping to be at [facility] home and talked about his plans how to keep busy while at home, read book, go out an do things, hope get discharge soon, did not offer any issues  Sleep:  Appetite:  Medication side effects:  ROS:  Mental Status Evaluation:  Appearance:  Dressed appropraitely   Behavior:  cooperative   Speech:  normal   Mood:  sad   Affect:  appropriate     Thought Process:  Goal directed   Thought Content:  normal   Perceptual Disturbances: Denied AV hallucination   Risk Potential: NO ALECIA    Sensorium:  normal   Cognition:  intact   Consciousness:  Alert, OX3   Attention: Fair   Insight:  fair   Judgment: fair   Gait/Station: With in normal range   Motor Activity: With in normal range     Progress Toward Goals: working on current treatment goals, no changes  Made in treatment plan   Recommended Treatment: Continue with group therapy, milieu therapy and occupational therapy  Risks, benefits and possible side effects of Medications:   Risks, benefits, and possible side effects of medications explained to patient and patient verbalizes understanding        Medications:   current meds:   Current Facility-Administered Medications   Medication Dose Route Frequency    acetaminophen (TYLENOL) tablet 650 mg  650 mg Oral Q4H PRN    acetaminophen (TYLENOL) tablet 650 mg  650 mg Oral Q4H PRN    acetaminophen (TYLENOL) tablet 975 mg  975 mg Oral Q6H PRN    albuterol (PROVENTIL HFA,VENTOLIN HFA) inhaler 2 puff  2 puff Inhalation Q6H PRN    aluminum-magnesium hydroxide-simethicone (MYLANTA) oral suspension 30 mL  30 mL Oral Q4H PRN    benztropine (COGENTIN) injection 1 mg  1 mg Intramuscular Q4H PRN Max 6/day    benztropine (COGENTIN) tablet 0 5 mg  0 5 mg Oral Q4H PRN Max 6/day    FLUoxetine (PROzac) capsule 20 mg  20 mg Oral Daily    fluticasone-vilanterol (BREO ELLIPTA) 100-25 mcg/inh inhaler 1 puff  1 puff Inhalation Daily    LORazepam (ATIVAN) injection 1 mg  1 mg Intramuscular Q6H PRN Max 3/day    LORazepam (ATIVAN) tablet 0 5 mg  0 5 mg Oral Q6H PRN Max 4/day    LORazepam (ATIVAN) tablet 1 mg  1 mg Oral Q6H PRN Max 3/day    melatonin tablet 3 mg  3 mg Oral HS    mirtazapine (REMERON) tablet 7 5 mg  7 5 mg Oral HS PRN    nicotine (NICODERM CQ) 21 mg/24 hr TD 24 hr patch 21 mg  21 mg Transdermal Daily    nicotine polacrilex (NICORETTE) gum 2 mg  2 mg Oral Q2H PRN    OLANZapine (ZyPREXA) IM injection 5 mg  5 mg Intramuscular Q3H PRN Max 3/day    OLANZapine (ZyPREXA) tablet 15 mg  15 mg Oral HS    OLANZapine (ZyPREXA) tablet 5 mg  5 mg Oral Q3H PRN Max 3/day    prazosin (MINIPRESS) capsule 1 mg  1 mg Oral HS    senna-docusate sodium (SENOKOT S) 8 6-50 mg per tablet 1 tablet  1 tablet Oral Daily PRN     Labs: NA    Assessment, Diagnosis  and Plan: continue with current meds and goals, F/U tomorrow    Counseling / Coordination of Care  Total floor / unit time spent today20 minutes  minutes  Greater than 50% of total time was spent with the patient and / or family counseling and / or coordination of care  A description of the counseling / coordination of care:      Hola Rivers MD

## 2021-07-04 NOTE — NURSING NOTE
Pt tired and retired to bed early this evening  Denies any needs at this time  Compliant with hs medications and is asleep in bed

## 2021-07-05 PROCEDURE — 99232 SBSQ HOSP IP/OBS MODERATE 35: CPT | Performed by: PSYCHIATRY & NEUROLOGY

## 2021-07-05 RX ADMIN — MELATONIN TAB 3 MG 3 MG: 3 TAB at 21:08

## 2021-07-05 RX ADMIN — NICOTINE 21 MG: 21 PATCH, EXTENDED RELEASE TRANSDERMAL at 08:18

## 2021-07-05 RX ADMIN — PHENYTOIN 1 MG: 125 SUSPENSION ORAL at 21:08

## 2021-07-05 RX ADMIN — FLUTICASONE FUROATE AND VILANTEROL TRIFENATATE 1 PUFF: 100; 25 POWDER RESPIRATORY (INHALATION) at 08:18

## 2021-07-05 RX ADMIN — FLUOXETINE 20 MG: 20 CAPSULE ORAL at 08:17

## 2021-07-05 RX ADMIN — OLANZAPINE 15 MG: 5 TABLET, FILM COATED ORAL at 21:08

## 2021-07-05 NOTE — PROGRESS NOTES
Progress Note - [address] [facility]  male MRN: [id number]  Unit/Bed#: Avni Hoang 647-03 Encounter: [id number]    The patient was seen for continuing care and reviewed with treatment team   No significant events in the past 24 hours  Sleep and appetite are adequate  Looking forward to going home tomorrow  No suicidal thoughts  Tolerating medications without difficulty  Current Mental Status Evaluation:  Appearance:  Adequate hygiene and grooming and Good eye contact   Behavior:  calm, cooperative and friendly   Mood:  improving   Affect: appropriate   Speech: Normal rate and Normal volume   Thought Process:  Goal directed and coherent   Thought Content:  Does not verbalize delusional material   Perceptual Disturbances: Denies hallucinations and does not appear to be responding to internal stimuli   Risk Potential: No suicidal or homicidal ideation   Orientation:        Progress Toward Goals:  Slowly improving  Principal Problem:    Unspecified mood (affective) disorder, without psychosis  Active Problems:    PTSD (post-traumatic stress disorder)    Simple chronic bronchitis (HCC)    Lung nodules    Tobacco abuse    Medical clearance for psychiatric admission      Recommended Treatment: Continue with pharmacotherapy, group therapy, milieu therapy and occupational therapy    The patient will be maintained on the following medications:  Current Facility-Administered Medications   Medication Dose Route Frequency Provider Last Rate    acetaminophen  650 mg Oral Q4H PRN Brown Siemens, CRNP      acetaminophen  650 mg Oral Q4H PRN Brown Siemens, CRNP      acetaminophen  975 mg Oral Q6H PRN Brown Siemens, CRNP      albuterol  2 puff Inhalation Q6H PRN Christen Mesa PA-C      aluminum-magnesium hydroxide-simethicone  30 mL Oral Q4H PRN Brown Siemens, KATIE      benztropine  1 mg Intramuscular Q4H PRN Max 6/day Brown Siemens, CRNP      benztropine  0 5 mg Oral Q4H PRN Max 6/day Jt Ehsan, KATIE      FLUoxetine  20 mg Oral Daily Henery Krabbe, MD      fluticasone-vilanterol  1 puff Inhalation Daily Christen Mesa PA-C      LORazepam  1 mg Intramuscular Q6H PRN Max 3/day Laura Moon, KATIE      LORazepam  0 5 mg Oral Q6H PRN Max 4/day Laura Moon, KATIE      LORazepam  1 mg Oral Q6H PRN Max 3/day Laura Moon, KATIE      melatonin  3 mg Oral HS Laura Moon, KATIE      mirtazapine  7 5 mg Oral HS PRN Laura Moon, KATIE      nicotine  21 mg Transdermal Daily Christen Mesa PA-C      nicotine polacrilex  2 mg Oral Q2H PRN KATIE Nelson      OLANZapine  5 mg Intramuscular Q3H PRN Max 3/day Laura Moon, KATIE      OLANZapine  15 mg Oral HS Laura Moon, KATIE      OLANZapine  5 mg Oral Q3H PRN Max 3/day KATIE Nelson      prazosin  1 mg Oral HS Henery Krabbe, MD      senna-docusate sodium  1 tablet Oral Daily PRN KATIE Nelson

## 2021-07-05 NOTE — PLAN OF CARE
Problem: Risk for Self Injury/Neglect  Goal: Treatment Goal: Remain safe during length of stay, learn and adopt new coping skills, and be free of self-injurious ideation, impulses and acts at the time of discharge  Outcome: Progressing     Problem: Depression  Goal: Treatment Goal: Demonstrate behavioral control of depressive symptoms, verbalize feelings of improved mood/affect, and adopt new coping skills prior to discharge  Outcome: Progressing  Goal: Verbalize thoughts and feelings  Description: Interventions:  - Assess and re-assess patient's level of risk   - Engage patient in 1:1 interactions, daily, for a minimum of 15 minutes   - Encourage patient to express feelings, fears, frustrations, hopes   Outcome: Progressing  Goal: Refrain from harming self  Description: Interventions:  - Monitor patient closely, per order   - Supervise medication ingestion, monitor effects and side effects   Outcome: Progressing  Goal: Refrain from isolation  Description: Interventions:  - Develop a trusting relationship   - Encourage socialization   Outcome: Progressing  Goal: Refrain from self-neglect  Outcome: Progressing  Goal: Complete daily ADLs, including personal hygiene independently, as able  Description: Interventions:  - Observe, teach, and assist patient with ADLS  -  Monitor and promote a balance of rest/activity, with adequate nutrition and elimination   Outcome: Progressing     Problem: Anxiety  Goal: Anxiety is at manageable level  Description: Interventions:  - Assess and monitor patient's anxiety level  - Monitor for signs and symptoms (heart palpitations, chest pain, shortness of breath, headaches, nausea, feeling jumpy, restlessness, irritable, apprehensive)  - Collaborate with interdisciplinary team and initiate plan and interventions as ordered    - [place] patient to unit/surroundings  - Explain treatment plan  - Encourage participation in care  - Encourage verbalization of concerns/fears  - Identify coping mechanisms  - Assist in developing anxiety-reducing skills  - Administer/offer alternative therapies  - Limit or eliminate stimulants  Outcome: Progressing     Problem: DISCHARGE PLANNING  Goal: Discharge to home or other facility with appropriate resources  Description: INTERVENTIONS:  - Identify barriers to discharge w/patient and caregiver  - Arrange for needed discharge resources and transportation as appropriate  - Identify discharge learning needs (meds, wound care, etc )  - Arrange for interpretive services to assist at discharge as needed  - Refer to Case Management Department for coordinating discharge planning if the patient needs post-hospital services based on physician/advanced practitioner order or complex needs related to functional status, cognitive ability, or social support system  Outcome: Progressing

## 2021-07-05 NOTE — NURSING NOTE
Pt calm, cooperative, pleasant on approach  Reports 6/10 depression, denies SI  Compliant with hs medications and slept all night

## 2021-07-05 NOTE — CMS CERTIFICATION NOTE
Certification: Based upon physical, mental and social evaluations, I certify that inpatient psychiatric services are medically necessary for this patient for a duration of 2 midnights for the treatment of major depressive episode  Available alternative community resources do not meet the patient's mental health care needs  I further attest that an established written individualized plan of care has been implemented and is outlined in the patient's medical records
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
## 2021-07-06 VITALS
DIASTOLIC BLOOD PRESSURE: 80 MMHG | WEIGHT: 200.62 LBS | OXYGEN SATURATION: 92 % | SYSTOLIC BLOOD PRESSURE: 106 MMHG | TEMPERATURE: 97.5 F | HEIGHT: 70 IN | HEART RATE: 86 BPM | BODY MASS INDEX: 28.72 KG/M2 | RESPIRATION RATE: 16 BRPM

## 2021-07-06 PROCEDURE — 99238 HOSP IP/OBS DSCHRG MGMT 30/<: CPT | Performed by: NURSE PRACTITIONER

## 2021-07-06 RX ORDER — FLUOXETINE HYDROCHLORIDE 20 MG/1
20 CAPSULE ORAL DAILY
Qty: 30 CAPSULE | Refills: 0 | Status: SHIPPED | OUTPATIENT
Start: 2021-07-07

## 2021-07-06 RX ORDER — NICOTINE 21 MG/24HR
1 PATCH, TRANSDERMAL 24 HOURS TRANSDERMAL DAILY
Qty: 28 PATCH | Refills: 0 | Status: SHIPPED | OUTPATIENT
Start: 2021-07-06

## 2021-07-06 RX ORDER — OLANZAPINE 15 MG/1
15 TABLET ORAL
Qty: 30 TABLET | Refills: 0 | Status: SHIPPED | OUTPATIENT
Start: 2021-07-06

## 2021-07-06 RX ORDER — LANOLIN ALCOHOL/MO/W.PET/CERES
3 CREAM (GRAM) TOPICAL
Qty: 30 TABLET | Refills: 0 | Status: SHIPPED | OUTPATIENT
Start: 2021-07-06

## 2021-07-06 RX ORDER — PRAZOSIN HYDROCHLORIDE 1 MG/1
1 CAPSULE ORAL
Qty: 30 CAPSULE | Refills: 0 | Status: SHIPPED | OUTPATIENT
Start: 2021-07-06

## 2021-07-06 RX ADMIN — NICOTINE 21 MG: 21 PATCH, EXTENDED RELEASE TRANSDERMAL at 08:29

## 2021-07-06 RX ADMIN — FLUOXETINE 20 MG: 20 CAPSULE ORAL at 08:28

## 2021-07-06 RX ADMIN — FLUTICASONE FUROATE AND VILANTEROL TRIFENATATE 1 PUFF: 100; 25 POWDER RESPIRATORY (INHALATION) at 08:29

## 2021-07-06 NOTE — PLAN OF CARE
Problem: Risk for Self Injury/Neglect  Goal: Treatment Goal: Remain safe during length of stay, learn and adopt new coping skills, and be free of self-injurious ideation, impulses and acts at the time of discharge  Outcome: Adequate for Discharge     Problem: Depression  Goal: Treatment Goal: Demonstrate behavioral control of depressive symptoms, verbalize feelings of improved mood/affect, and adopt new coping skills prior to discharge  Outcome: Adequate for Discharge  Goal: Verbalize thoughts and feelings  Description: Interventions:  - Assess and re-assess patient's level of risk   - Engage patient in 1:1 interactions, daily, for a minimum of 15 minutes   - Encourage patient to express feelings, fears, frustrations, hopes   Outcome: Adequate for Discharge  Goal: Refrain from harming self  Description: Interventions:  - Monitor patient closely, per order   - Supervise medication ingestion, monitor effects and side effects   Outcome: Adequate for Discharge  Goal: Refrain from isolation  Description: Interventions:  - Develop a trusting relationship   - Encourage socialization   Outcome: Adequate for Discharge  Goal: Refrain from self-neglect  Outcome: Adequate for Discharge  Goal: Attend and participate in unit activities, including therapeutic, recreational, and educational groups  Description: Interventions:  - Provide therapeutic and educational activities daily, encourage attendance and participation, and document same in the medical record   Outcome: Adequate for Discharge  Goal: Complete daily ADLs, including personal hygiene independently, as able  Description: Interventions:  - Observe, teach, and assist patient with ADLS  -  Monitor and promote a balance of rest/activity, with adequate nutrition and elimination   Outcome: Adequate for Discharge     Problem: Anxiety  Goal: Anxiety is at manageable level  Description: Interventions:  - Assess and monitor patient's anxiety level     - Monitor for signs and symptoms (heart palpitations, chest pain, shortness of breath, headaches, nausea, feeling jumpy, restlessness, irritable, apprehensive)  - Collaborate with interdisciplinary team and initiate plan and interventions as ordered    - [place] patient to unit/surroundings  - Explain treatment plan  - Encourage participation in care  - Encourage verbalization of concerns/fears  - Identify coping mechanisms  - Assist in developing anxiety-reducing skills  - Administer/offer alternative therapies  - Limit or eliminate stimulants  Outcome: Adequate for Discharge     Problem: Ineffective Coping  Goal: Participates in unit activities  Description: Interventions:  - Provide therapeutic environment   - Provide required programming   - Redirect inappropriate behaviors   Outcome: Adequate for Discharge
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
## 2021-07-06 NOTE — PLAN OF CARE
Problem: Risk for Self Injury/Neglect  Goal: Treatment Goal: Remain safe during length of stay, learn and adopt new coping skills, and be free of self-injurious ideation, impulses and acts at the time of discharge  Outcome: Progressing     Problem: Depression  Goal: Treatment Goal: Demonstrate behavioral control of depressive symptoms, verbalize feelings of improved mood/affect, and adopt new coping skills prior to discharge  Outcome: Progressing  Goal: Refrain from harming self  Description: Interventions:  - Monitor patient closely, per order   - Supervise medication ingestion, monitor effects and side effects   Outcome: Progressing  Goal: Refrain from isolation  Description: Interventions:  - Develop a trusting relationship   - Encourage socialization   Outcome: Progressing  Goal: Refrain from self-neglect  Outcome: Progressing     Problem: Depression  Goal: Verbalize thoughts and feelings  Description: Interventions:  - Assess and re-assess patient's level of risk   - Engage patient in 1:1 interactions, daily, for a minimum of 15 minutes   - Encourage patient to express feelings, fears, frustrations, hopes   Outcome: Not Progressing

## 2021-07-06 NOTE — NURSING NOTE
Patient was visible in the milieu socializing with select peers  VSS  Rate anxiety 2 on 4, depression 6 on 10, denies all other s/s at this time, No snack  Cooperative and compliant with HS medications  Safety checks ongoing

## 2021-07-06 NOTE — PLAN OF CARE
Problem: Risk for Self Injury/Neglect  Goal: Treatment Goal: Remain safe during length of stay, learn and adopt new coping skills, and be free of self-injurious ideation, impulses and acts at the time of discharge  7/6/2021 1203 by Ronnell Montero RN  Outcome: Adequate for Discharge  7/6/2021 1101 by Ronnell Montero RN  Outcome: Progressing     Problem: Depression  Goal: Treatment Goal: Demonstrate behavioral control of depressive symptoms, verbalize feelings of improved mood/affect, and adopt new coping skills prior to discharge  7/6/2021 1203 by Ronnell Montero RN  Outcome: Adequate for Discharge  7/6/2021 1101 by Ronnell Montero RN  Outcome: Progressing  Goal: Verbalize thoughts and feelings  Description: Interventions:  - Assess and re-assess patient's level of risk   - Engage patient in 1:1 interactions, daily, for a minimum of 15 minutes   - Encourage patient to express feelings, fears, frustrations, hopes   7/6/2021 1203 by Ronnell Montero RN  Outcome: Adequate for Discharge  7/6/2021 1101 by Ronnell Montero RN  Outcome: Not Progressing  Goal: Refrain from harming self  Description: Interventions:  - Monitor patient closely, per order   - Supervise medication ingestion, monitor effects and side effects   7/6/2021 1203 by Ronnell Montero RN  Outcome: Adequate for Discharge  7/6/2021 1101 by Ronnell Montero RN  Outcome: Progressing  Goal: Refrain from isolation  Description: Interventions:  - Develop a trusting relationship   - Encourage socialization   7/6/2021 1203 by Ronnell Montero RN  Outcome: Adequate for Discharge  7/6/2021 1101 by Ronnell Montero RN  Outcome: Progressing  Goal: Refrain from self-neglect  7/6/2021 1203 by Ronnell Montero RN  Outcome: Adequate for Discharge  7/6/2021 1101 by Ronnell Montero RN  Outcome: Progressing  Goal: Attend and participate in unit activities, including therapeutic, recreational, and educational groups  Description: Interventions:  - Provide therapeutic and educational activities daily, encourage attendance and participation, and document same in the medical record   Outcome: Adequate for Discharge  Goal: Complete daily ADLs, including personal hygiene independently, as able  Description: Interventions:  - Observe, teach, and assist patient with ADLS  -  Monitor and promote a balance of rest/activity, with adequate nutrition and elimination   Outcome: Adequate for Discharge     Problem: Anxiety  Goal: Anxiety is at manageable level  Description: Interventions:  - Assess and monitor patient's anxiety level  - Monitor for signs and symptoms (heart palpitations, chest pain, shortness of breath, headaches, nausea, feeling jumpy, restlessness, irritable, apprehensive)  - Collaborate with interdisciplinary team and initiate plan and interventions as ordered    - Ashley patient to unit/surroundings  - Explain treatment plan  - Encourage participation in care  - Encourage verbalization of concerns/fears  - Identify coping mechanisms  - Assist in developing anxiety-reducing skills  - Administer/offer alternative therapies  - Limit or eliminate stimulants  Outcome: Adequate for Discharge     Problem: Ineffective Coping  Goal: Participates in unit activities  Description: Interventions:  - Provide therapeutic environment   - Provide required programming   - Redirect inappropriate behaviors   Outcome: Adequate for Discharge

## 2021-07-06 NOTE — CASE MANAGEMENT
07/06/21 0856   Team Meeting   Meeting Type Daily Rounds   Initial Conference Date 07/06/21   Team Members Present   Team Members Present Physician;Nurse;;Occupational Therapist   Physician Team Member Ruben S   Team Member [facility] Management Team Member [place]   OT Team Member Quintin Bamberger   Patient/Family Present   Patient Present No   Patient's Family Present No     Discharge today; visible, social, 2/4 anxiety, cooperative, med compliant, refused to complete relapse prevention plan, slept

## 2021-07-06 NOTE — NURSING NOTE
Patient is alert and oriented per his baseline  Vitals sign stable, no s/s of acute respiratory distress noted  This writer went over the discharge information with patient  Belongings checked off  Mr Melyssa Armijo was ccompanied by the RN to the main lobby  Patient discharged to home via lift car services

## 2021-07-06 NOTE — PROGRESS NOTES
Pt  Cooperative,social and stated readiness for discharge  Pt expressed his appreciation especially to another peer with whom he had spiritual wellness discussions on the weekend on the unit  Reviewed relapse prevention plan process  07/06/21 1000 07/06/21 1100   Activity/Group Checklist   Group Community meeting  (life advice task ) Admission/Discharge  (reviewed relapse prevention planning process )   Attendance Attended Attended   Attendance Duration (min) 31-45 0-15  (1-1)   Interactions Interacted appropriately Interacted appropriately   Affect/Mood Appropriate;Bright;Normal range Appropriate;Calm;Normal range   Goals Achieved Able to engage in interactions; Discussed coping strategies Able to engage in interactions; Identified relapse prevention strategies; Discussed coping strategies; Identified resources and support systems

## 2021-07-06 NOTE — PLAN OF CARE
Problem: DISCHARGE PLANNING  Goal: Discharge to home or other facility with appropriate resources  Description: INTERVENTIONS:  - Identify barriers to discharge w/patient and caregiver  - Arrange for needed discharge resources and transportation as appropriate  - Identify discharge learning needs (meds, wound care, etc )  - Arrange for interpretive services to assist at discharge as needed  - Refer to Case Management Department for coordinating discharge planning if the patient needs post-hospital services based on physician/advanced practitioner order or complex needs related to functional status, cognitive ability, or social support system  Outcome: Completed     Discharge planning discussed with pt and pt's friend  [employer] transportation arranged -- 12:30pm   Unit will get a message with the 's info around 12:30pm and pt needs to be in main lobby by the time the  arrives  OP psych appt scheduled  Transportation referral made on pt's behalf  IMM letter signed  Med scripts should be sent to preferred pharmacy [facility] in [facility])  Spoke with pt's friend, Lisa Bhavna [phone], to give d/c notification  She is in agreement with d/c plan  Left  for Johnathan Gamez,  @[employer], to give d/c notification

## 2021-07-06 NOTE — BH TRANSITION RECORD
Contact Information: If you have any questions, concerns, pended studies, tests and/or procedures, or emergencies regarding your inpatient behavioral health visit  Please contact [facility] older adult behavioral health unit 6T [phone] and ask to speak to a , nurse or physician  A contact is available 24 hours/ 7 days a week at this number  Summary of Procedures Performed During your Stay:  Below is a list of major procedures performed during your hospital stay and a summary of results:  - No major procedures performed  Pending Studies (From admission, onward)    None        If studies are pending at discharge, follow up with your PCP and/or referring provider

## 2021-10-05 DIAGNOSIS — J41.0 SIMPLE CHRONIC BRONCHITIS (HCC): ICD-10-CM

## 2021-10-06 RX ORDER — FLUTICASONE FUROATE, UMECLIDINIUM BROMIDE AND VILANTEROL TRIFENATATE 100; 62.5; 25 UG/1; UG/1; UG/1
POWDER RESPIRATORY (INHALATION)
Qty: 60 BLISTER | Refills: 3 | Status: SHIPPED | OUTPATIENT
Start: 2021-10-06

## 2021-10-26 ENCOUNTER — TELEPHONE (OUTPATIENT)
Dept: INTERNAL MEDICINE CLINIC | Facility: CLINIC | Age: 61
End: 2021-10-26

## 2021-10-26 DIAGNOSIS — J41.0 SIMPLE CHRONIC BRONCHITIS (HCC): ICD-10-CM

## 2021-10-28 ENCOUNTER — APPOINTMENT (OUTPATIENT)
Dept: LAB | Facility: CLINIC | Age: 61
End: 2021-10-28
Payer: MEDICARE

## 2021-10-28 ENCOUNTER — OFFICE VISIT (OUTPATIENT)
Dept: INTERNAL MEDICINE CLINIC | Facility: CLINIC | Age: 61
End: 2021-10-28
Payer: MEDICARE

## 2021-10-28 VITALS
TEMPERATURE: 98 F | OXYGEN SATURATION: 97 % | HEIGHT: 70 IN | WEIGHT: 201.2 LBS | BODY MASS INDEX: 28.8 KG/M2 | DIASTOLIC BLOOD PRESSURE: 90 MMHG | HEART RATE: 109 BPM | SYSTOLIC BLOOD PRESSURE: 130 MMHG

## 2021-10-28 DIAGNOSIS — I10 ESSENTIAL HYPERTENSION: ICD-10-CM

## 2021-10-28 DIAGNOSIS — R91.8 LUNG NODULES: ICD-10-CM

## 2021-10-28 DIAGNOSIS — Z12.5 PROSTATE CANCER SCREENING: ICD-10-CM

## 2021-10-28 DIAGNOSIS — Z11.59 ENCOUNTER FOR HEPATITIS C SCREENING TEST FOR LOW RISK PATIENT: ICD-10-CM

## 2021-10-28 DIAGNOSIS — J41.0 SIMPLE CHRONIC BRONCHITIS (HCC): ICD-10-CM

## 2021-10-28 DIAGNOSIS — Z12.11 COLON CANCER SCREENING: ICD-10-CM

## 2021-10-28 DIAGNOSIS — R73.9 HYPERGLYCEMIA: ICD-10-CM

## 2021-10-28 DIAGNOSIS — Z23 ENCOUNTER FOR IMMUNIZATION: Primary | ICD-10-CM

## 2021-10-28 LAB
ALBUMIN SERPL BCP-MCNC: 4.2 G/DL (ref 3.5–5)
ALP SERPL-CCNC: 86 U/L (ref 46–116)
ALT SERPL W P-5'-P-CCNC: 25 U/L (ref 12–78)
ANION GAP SERPL CALCULATED.3IONS-SCNC: 5 MMOL/L (ref 4–13)
AST SERPL W P-5'-P-CCNC: 14 U/L (ref 5–45)
BASOPHILS # BLD AUTO: 0.03 THOUSANDS/ΜL (ref 0–0.1)
BASOPHILS NFR BLD AUTO: 0 % (ref 0–1)
BILIRUB SERPL-MCNC: 0.36 MG/DL (ref 0.2–1)
BILIRUB UR QL STRIP: ABNORMAL
BUN SERPL-MCNC: 12 MG/DL (ref 5–25)
CALCIUM SERPL-MCNC: 9 MG/DL (ref 8.3–10.1)
CHLORIDE SERPL-SCNC: 108 MMOL/L (ref 100–108)
CHOLEST SERPL-MCNC: 133 MG/DL (ref 50–200)
CLARITY UR: CLEAR
CO2 SERPL-SCNC: 23 MMOL/L (ref 21–32)
COLOR UR: ABNORMAL
CREAT SERPL-MCNC: 0.82 MG/DL (ref 0.6–1.3)
EOSINOPHIL # BLD AUTO: 0.09 THOUSAND/ΜL (ref 0–0.61)
EOSINOPHIL NFR BLD AUTO: 1 % (ref 0–6)
ERYTHROCYTE [DISTWIDTH] IN BLOOD BY AUTOMATED COUNT: 12.9 % (ref 11.6–15.1)
EST. AVERAGE GLUCOSE BLD GHB EST-MCNC: 108 MG/DL
GFR SERPL CREATININE-BSD FRML MDRD: 96 ML/MIN/1.73SQ M
GLUCOSE SERPL-MCNC: 121 MG/DL (ref 65–140)
GLUCOSE UR STRIP-MCNC: NEGATIVE MG/DL
HBA1C MFR BLD: 5.4 %
HCT VFR BLD AUTO: 49 % (ref 36.5–49.3)
HCV AB SER QL: NORMAL
HDLC SERPL-MCNC: 42 MG/DL
HGB BLD-MCNC: 16.3 G/DL (ref 12–17)
HGB UR QL STRIP.AUTO: NEGATIVE
IMM GRANULOCYTES # BLD AUTO: 0.04 THOUSAND/UL (ref 0–0.2)
IMM GRANULOCYTES NFR BLD AUTO: 1 % (ref 0–2)
KETONES UR STRIP-MCNC: NEGATIVE MG/DL
LDLC SERPL CALC-MCNC: 72 MG/DL (ref 0–100)
LEUKOCYTE ESTERASE UR QL STRIP: NEGATIVE
LYMPHOCYTES # BLD AUTO: 1.28 THOUSANDS/ΜL (ref 0.6–4.47)
LYMPHOCYTES NFR BLD AUTO: 17 % (ref 14–44)
MCH RBC QN AUTO: 31 PG (ref 26.8–34.3)
MCHC RBC AUTO-ENTMCNC: 33.3 G/DL (ref 31.4–37.4)
MCV RBC AUTO: 93 FL (ref 82–98)
MONOCYTES # BLD AUTO: 0.58 THOUSAND/ΜL (ref 0.17–1.22)
MONOCYTES NFR BLD AUTO: 8 % (ref 4–12)
NEUTROPHILS # BLD AUTO: 5.48 THOUSANDS/ΜL (ref 1.85–7.62)
NEUTS SEG NFR BLD AUTO: 73 % (ref 43–75)
NITRITE UR QL STRIP: NEGATIVE
NRBC BLD AUTO-RTO: 0 /100 WBCS
PH UR STRIP.AUTO: 5.5 [PH]
PLATELET # BLD AUTO: 260 THOUSANDS/UL (ref 149–390)
PMV BLD AUTO: 9.3 FL (ref 8.9–12.7)
POTASSIUM SERPL-SCNC: 3.7 MMOL/L (ref 3.5–5.3)
PROT SERPL-MCNC: 8.1 G/DL (ref 6.4–8.2)
PROT UR STRIP-MCNC: NEGATIVE MG/DL
PSA SERPL-MCNC: 0.4 NG/ML (ref 0–4)
RBC # BLD AUTO: 5.26 MILLION/UL (ref 3.88–5.62)
SODIUM SERPL-SCNC: 136 MMOL/L (ref 136–145)
SP GR UR STRIP.AUTO: 1.03 (ref 1–1.03)
TRIGL SERPL-MCNC: 95 MG/DL
TSH SERPL DL<=0.05 MIU/L-ACNC: 3.3 UIU/ML (ref 0.36–3.74)
UROBILINOGEN UR QL STRIP.AUTO: 0.2 E.U./DL
WBC # BLD AUTO: 7.5 THOUSAND/UL (ref 4.31–10.16)

## 2021-10-28 PROCEDURE — 99214 OFFICE O/P EST MOD 30 MIN: CPT | Performed by: INTERNAL MEDICINE

## 2021-10-28 PROCEDURE — 36415 COLL VENOUS BLD VENIPUNCTURE: CPT

## 2021-10-28 PROCEDURE — G0008 ADMIN INFLUENZA VIRUS VAC: HCPCS | Performed by: INTERNAL MEDICINE

## 2021-10-28 PROCEDURE — 83036 HEMOGLOBIN GLYCOSYLATED A1C: CPT

## 2021-10-28 PROCEDURE — 85025 COMPLETE CBC W/AUTO DIFF WBC: CPT

## 2021-10-28 PROCEDURE — G0438 PPPS, INITIAL VISIT: HCPCS | Performed by: INTERNAL MEDICINE

## 2021-10-28 PROCEDURE — 86803 HEPATITIS C AB TEST: CPT

## 2021-10-28 PROCEDURE — G0103 PSA SCREENING: HCPCS

## 2021-10-28 PROCEDURE — 80053 COMPREHEN METABOLIC PANEL: CPT

## 2021-10-28 PROCEDURE — 90682 RIV4 VACC RECOMBINANT DNA IM: CPT | Performed by: INTERNAL MEDICINE

## 2021-10-28 PROCEDURE — 80061 LIPID PANEL: CPT

## 2021-10-28 PROCEDURE — 84443 ASSAY THYROID STIM HORMONE: CPT

## 2021-10-28 PROCEDURE — 81003 URINALYSIS AUTO W/O SCOPE: CPT | Performed by: INTERNAL MEDICINE

## 2021-10-28 RX ORDER — ALBUTEROL SULFATE 90 UG/1
2 AEROSOL, METERED RESPIRATORY (INHALATION) EVERY 6 HOURS PRN
Qty: 18 G | Refills: 1 | Status: SHIPPED | OUTPATIENT
Start: 2021-10-28

## 2021-10-28 RX ORDER — ALBUTEROL SULFATE 90 UG/1
2 AEROSOL, METERED RESPIRATORY (INHALATION) EVERY 6 HOURS PRN
Qty: 18 G | Refills: 1 | Status: SHIPPED | OUTPATIENT
Start: 2021-10-28 | End: 2021-10-28 | Stop reason: SDUPTHER

## 2021-11-17 ENCOUNTER — HOSPITAL ENCOUNTER (OUTPATIENT)
Dept: CT IMAGING | Facility: CLINIC | Age: 61
Discharge: HOME | End: 2021-11-17
Payer: MEDICARE

## 2021-11-17 DIAGNOSIS — J41.0 SIMPLE CHRONIC BRONCHITIS (HCC): ICD-10-CM

## 2021-11-17 DIAGNOSIS — R91.8 LUNG NODULES: ICD-10-CM

## 2021-11-17 PROCEDURE — 71250 CT THORAX DX C-: CPT

## 2021-11-17 PROCEDURE — G1004 CDSM NDSC: HCPCS

## 2021-12-21 ENCOUNTER — OFFICE VISIT (OUTPATIENT)
Dept: INTERNAL MEDICINE CLINIC | Facility: CLINIC | Age: 61
End: 2021-12-21
Payer: MEDICARE

## 2021-12-21 VITALS
HEART RATE: 109 BPM | TEMPERATURE: 98.2 F | SYSTOLIC BLOOD PRESSURE: 112 MMHG | WEIGHT: 193.8 LBS | OXYGEN SATURATION: 99 % | BODY MASS INDEX: 27.75 KG/M2 | HEIGHT: 70 IN | DIASTOLIC BLOOD PRESSURE: 70 MMHG

## 2021-12-21 DIAGNOSIS — Z12.12 SCREENING FOR COLORECTAL CANCER: ICD-10-CM

## 2021-12-21 DIAGNOSIS — Z11.4 SCREENING FOR HIV (HUMAN IMMUNODEFICIENCY VIRUS): ICD-10-CM

## 2021-12-21 DIAGNOSIS — Z12.11 SCREENING FOR COLORECTAL CANCER: ICD-10-CM

## 2021-12-21 DIAGNOSIS — Z72.0 TOBACCO ABUSE: Primary | ICD-10-CM

## 2021-12-21 PROCEDURE — 99214 OFFICE O/P EST MOD 30 MIN: CPT | Performed by: INTERNAL MEDICINE

## 2021-12-21 RX ORDER — PREDNISONE 10 MG/1
10 TABLET ORAL
COMMUNITY
Start: 2021-12-12

## 2021-12-21 RX ORDER — PANTOPRAZOLE SODIUM 40 MG/1
40 TABLET, DELAYED RELEASE ORAL
COMMUNITY
Start: 2021-12-13 | End: 2022-01-12

## 2021-12-21 RX ORDER — VARENICLINE TARTRATE
KIT
Qty: 53 TABLET | Refills: 0 | Status: SHIPPED | OUTPATIENT
Start: 2021-12-21

## 2021-12-21 RX ORDER — AMLODIPINE BESYLATE 2.5 MG/1
2.5 TABLET ORAL DAILY
COMMUNITY
Start: 2021-12-13 | End: 2022-01-12

## 2021-12-21 RX ORDER — GUAIFENESIN 200 MG/1
400 TABLET ORAL EVERY 4 HOURS PRN
COMMUNITY
Start: 2021-12-12 | End: 2021-12-22

## 2022-05-18 DIAGNOSIS — Z12.11 SPECIAL SCREENING FOR MALIGNANT NEOPLASMS, COLON: Primary | ICD-10-CM

## 2022-07-05 ENCOUNTER — TELEPHONE (OUTPATIENT)
Dept: INTERNAL MEDICINE CLINIC | Facility: CLINIC | Age: 62
End: 2022-07-05

## 2022-11-08 ENCOUNTER — TELEPHONE (OUTPATIENT)
Dept: INTERNAL MEDICINE CLINIC | Facility: CLINIC | Age: 62
End: 2022-11-08

## 2023-03-10 ENCOUNTER — TELEPHONE (OUTPATIENT)
Dept: ADMINISTRATIVE | Facility: OTHER | Age: 63
End: 2023-03-10

## 2023-03-10 NOTE — TELEPHONE ENCOUNTER
03/10/23 3:52 PM    The patient was called and a message could not be left on the phone number on file/ phone number on file is incorrect  Thank you    Hannah Whitehead  [facility]
